# Patient Record
Sex: MALE | Race: WHITE | URBAN - METROPOLITAN AREA
[De-identification: names, ages, dates, MRNs, and addresses within clinical notes are randomized per-mention and may not be internally consistent; named-entity substitution may affect disease eponyms.]

---

## 2018-02-21 ENCOUNTER — TELEPHONE (OUTPATIENT)
Dept: CARDIOLOGY CLINIC | Age: 48
End: 2018-02-21

## 2018-02-21 NOTE — TELEPHONE ENCOUNTER
Medication Refill    When was your last appointment with cardiology?  3/22/18 appt  [facility] pt  (if 1year or longer, please schedule an appointment)    Medication needing refilled: Metoprolol tartrate    Doseage of the medication:25 mg     How are you taking this medication (QD, BID, TID, QID, PRN): one tablet twice daily    Patient want a 30 or 90 day supply called in:30    Which Pharmacy are we sending the medication to: [facility] Phone number: [phone]    Pharmacy Fax number:

## 2018-03-22 ENCOUNTER — OFFICE VISIT (OUTPATIENT)
Dept: CARDIOLOGY CLINIC | Age: 48
End: 2018-03-22

## 2018-03-22 VITALS
DIASTOLIC BLOOD PRESSURE: 78 MMHG | HEART RATE: 62 BPM | OXYGEN SATURATION: 99 % | HEIGHT: 69 IN | WEIGHT: 196.4 LBS | BODY MASS INDEX: 29.09 KG/M2 | SYSTOLIC BLOOD PRESSURE: 120 MMHG

## 2018-03-22 DIAGNOSIS — Z95.810 AICD (AUTOMATIC CARDIOVERTER/DEFIBRILLATOR) PRESENT: Primary | ICD-10-CM

## 2018-03-22 DIAGNOSIS — I47.29 PVT (PAROXYSMAL VENTRICULAR TACHYCARDIA): ICD-10-CM

## 2018-03-22 DIAGNOSIS — I10 BENIGN ESSENTIAL HTN: ICD-10-CM

## 2018-03-22 DIAGNOSIS — Z79.899 ON AMIODARONE THERAPY: ICD-10-CM

## 2018-03-22 DIAGNOSIS — Z95.810 ICD (IMPLANTABLE CARDIOVERTER-DEFIBRILLATOR) IN PLACE: ICD-10-CM

## 2018-03-22 DIAGNOSIS — I45.81 LONG Q-T SYNDROME: ICD-10-CM

## 2018-03-22 PROCEDURE — 93283 PRGRMG EVAL IMPLANTABLE DFB: CPT | Performed by: INTERNAL MEDICINE

## 2018-03-22 PROCEDURE — 93000 ELECTROCARDIOGRAM COMPLETE: CPT | Performed by: INTERNAL MEDICINE

## 2018-03-22 PROCEDURE — 99204 OFFICE O/P NEW MOD 45 MIN: CPT | Performed by: INTERNAL MEDICINE

## 2018-03-22 RX ORDER — PREDNISONE 1 MG/1
5 TABLET ORAL DAILY
COMMUNITY
End: 2022-01-13 | Stop reason: ALTCHOICE

## 2018-03-22 RX ORDER — LEVOTHYROXINE SODIUM 0.03 MG/1
75 TABLET ORAL DAILY
COMMUNITY

## 2018-03-22 RX ORDER — AMIODARONE HYDROCHLORIDE 200 MG/1
TABLET ORAL
COMMUNITY
Start: 2017-05-17 | End: 2018-09-26 | Stop reason: SDUPTHER

## 2018-03-22 NOTE — PROGRESS NOTES
· Neurological: Alert and oriented. Cranial nerve appears intact, No Gross deficit   · Skin: Skin is warm and dry. No rash noted. · Psychiatric: Has a normal behavior       Labs, diagnostic and imaging results reviewed. Reviewed. LABS:   Cr 1.30 (11/2016)    ECG:  Today 3/22/2018 personally reviewed  SB 57bpm  QTc 483msec    Echo: 3/2016  Summary:  The left ventricular wall motion is normal.  There is mild mitral regurgitation. There is a pacemaker lead in the right ventricle. Overall left ventricular ejection fraction is estimated to be 65-70%. The left ventricular function is normal.  The Aortic Valve is mildly calcified. ICD wire thick  Chiari network (normal variant) is noted. Mild tricuspid regurgitation is present. Right ventricular systolic pressure is normal at <35 mmHg. Medication:  Current Outpatient Prescriptions   Medication Sig Dispense Refill    amiodarone (CORDARONE) 200 MG tablet TAKE ONE-HALF TABLET BY MOUTH DAILY      levothyroxine (SYNTHROID) 25 MCG tablet Take 50 mcg by mouth daily       predniSONE (DELTASONE) 5 MG tablet Take 5 mg by mouth daily 1 tablet every other day      metoprolol tartrate (LOPRESSOR) 25 MG tablet Take 1 tablet by mouth 2 times daily 60 tablet 3     No current facility-administered medications for this visit. Assessment and plan:     1. PVT (paroxysmal ventricular tachycardia) (HCC)  History of sudden cardiac death in 2007. He has had two appropriate shocks and two inappropriate shocks for T wave oversensing for the lifetime of the device. He has had no ICD fires since 2009. Currently on Amiodarone 100 mg QOD     2. AICD (automatic cardioverter/defibrillator) present  appropriate device function: The CIED was interrogated and programmed and I supervised and reviewed all the data. All findings and changes are in device interrogation sheet and reflect my personal interpretation and changes and is scanned to Epic.     AT/AF episodes, all show

## 2018-03-26 PROBLEM — I45.81 LONG Q-T SYNDROME: Status: ACTIVE | Noted: 2018-03-26

## 2018-03-26 PROBLEM — Z95.810 ICD (IMPLANTABLE CARDIOVERTER-DEFIBRILLATOR) IN PLACE: Status: ACTIVE | Noted: 2018-03-26

## 2018-06-01 ENCOUNTER — PROCEDURE VISIT (OUTPATIENT)
Dept: CARDIOLOGY CLINIC | Age: 48
End: 2018-06-01

## 2018-06-01 DIAGNOSIS — Z95.810 ICD (IMPLANTABLE CARDIOVERTER-DEFIBRILLATOR) IN PLACE: Primary | ICD-10-CM

## 2018-09-26 RX ORDER — AMIODARONE HYDROCHLORIDE 200 MG/1
TABLET ORAL
Qty: 45 TABLET | Refills: 3 | Status: SHIPPED | OUTPATIENT
Start: 2018-09-26 | End: 2019-08-19 | Stop reason: SDUPTHER

## 2018-09-26 NOTE — TELEPHONE ENCOUNTER
Pt had labs done in July, they are in care everywhere    Last ov 3/22/18  Pending appt due in a year  Last refill not filled here yet

## 2019-03-28 PROCEDURE — 93228 REMOTE 30 DAY ECG REV/REPORT: CPT | Performed by: INTERNAL MEDICINE

## 2019-04-01 DIAGNOSIS — I45.81 LONG Q-T SYNDROME: ICD-10-CM

## 2019-04-01 DIAGNOSIS — Z95.810 ICD (IMPLANTABLE CARDIOVERTER-DEFIBRILLATOR) IN PLACE: ICD-10-CM

## 2019-07-30 NOTE — PROGRESS NOTES
[address]   Electrophysiology Follow up  Date: 2019    Chief Complaint   Patient presents with    Tachycardia        HPI: Lc Bowers is a 44 y.o. male seen for a maintenance evaluation for PVT. History includes Vfib and VT, AICD implant for secondary prevention of sudden cardiac death. Brother  from sudden cardiac death at age 36. 3/2/2016 genetic testing was positive for SCN5A mutation. 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 This variant has been reported in association with ST elevation and prolonged QT intervals in some individuals. Continued with amiodarone due to fear of ICD shock which has occurred in past with palpitations while not on amio. No shock, syncope, palpitation, or VT on device as of last OV 2017. ECG interpretation 2017:  Sinus bradycardia, indeterminate axis, inferior infarct, age undetermined 57 bpm, QTc 478 ms    Interval History: There was noise on his atrial lead in  and July. He is not sure what he was doing. Could have been when in a swimming pool or when showering. His niece had an inappropriate shock while in pool due to electrical issues          Past Medical History:   Diagnosis Date    Anxiety     Hyperthyroidism         No relevant past surgical history . Allergies: Allergies   Allergen Reactions    Penicillins      SOB       Social History:   reports that he has been smoking. He has never used smokeless tobacco. He reports that he drinks alcohol. He reports that he does not use drugs. Family History:  family history includes Coronary Art Dis in his father; Heart Disease in his father. Reviewed. Denies family history of sudden cardiac death, arrhythmia, premature CAD    Review of System:  All other systems reviewed and are negative except for that noted above.  Pertinent negatives are:   General: negative for fever, chills   Ophthalmic ROS: negative for - eye pain or loss of vision  ENT noted.  Mild tricuspid regurgitation is present. Right ventricular systolic pressure is normal at <35 mmHg. Medication:  Current Outpatient Medications   Medication Sig Dispense Refill    metoprolol tartrate (LOPRESSOR) 25 MG tablet TAKE ONE TABLET BY MOUTH TWICE A  tablet 3    amiodarone (CORDARONE) 200 MG tablet TAKE ONE-HALF TABLET BY MOUTH DAILY 45 tablet 3    levothyroxine (SYNTHROID) 25 MCG tablet Take 50 mcg by mouth daily       predniSONE (DELTASONE) 5 MG tablet Take 5 mg by mouth daily 1 tablet every other day       No current facility-administered medications for this visit. Assessment and plan:     1. Paroxysmal VT (paroxysmal ventricular tachycardia) ([facility])  History of sudden cardiac death in 2007. He has had two appropriate shocks and two inappropriate shocks for T wave oversensing for the lifetime of the device. He has had no ICD fires since 2009. Currently on Amiodarone 100 mg QOD   No side effects   No shock or VT    2. AICD (automatic cardioverter/defibrillator) present   The CIED was interrogated and programmed and I supervised and reviewed all the data. All findings and changes are in device interrogation sheet and reflect my personal interpretation and changes and is scanned to Epic. Noise on atrial everett  Likely SAKSHI      3. On amiodarone therapy  100 mg every other day. If recurrent VT, will substitute(mexiletine) or consider ablation    4. Hypertension: Controlled blood pressure. Currently on Metoprolol. 5.SCN5A mutation: QT is prolonged and he has rsr'. May need to do procainamide challenge in future to see if the presentation is Brugada or not. However, management is the same    Plan:  Follow up in 1 year  TSH and LFT     I independently reviewed *device check interrogation     Thank you for allowing me to participate in the care of [address] [place] [address]. Further evaluation will be based upon the patient's clinical course and testing results.      All questions

## 2019-07-31 ENCOUNTER — OFFICE VISIT (OUTPATIENT)
Dept: CARDIOLOGY CLINIC | Age: 49
End: 2019-07-31
Payer: COMMERCIAL

## 2019-07-31 ENCOUNTER — PROCEDURE VISIT (OUTPATIENT)
Dept: CARDIOLOGY CLINIC | Age: 49
End: 2019-07-31
Payer: COMMERCIAL

## 2019-07-31 VITALS
HEIGHT: 69 IN | BODY MASS INDEX: 29.18 KG/M2 | SYSTOLIC BLOOD PRESSURE: 136 MMHG | HEART RATE: 73 BPM | DIASTOLIC BLOOD PRESSURE: 88 MMHG | WEIGHT: 197 LBS

## 2019-07-31 DIAGNOSIS — Z79.899 LONG TERM CURRENT USE OF AMIODARONE: ICD-10-CM

## 2019-07-31 DIAGNOSIS — I47.20 VT (VENTRICULAR TACHYCARDIA): Primary | ICD-10-CM

## 2019-07-31 DIAGNOSIS — I45.81 LONG Q-T SYNDROME: ICD-10-CM

## 2019-07-31 DIAGNOSIS — R00.0 TACHYCARDIA: ICD-10-CM

## 2019-07-31 DIAGNOSIS — Z95.810 AICD (AUTOMATIC CARDIOVERTER/DEFIBRILLATOR) PRESENT: ICD-10-CM

## 2019-07-31 DIAGNOSIS — I10 BENIGN ESSENTIAL HTN: ICD-10-CM

## 2019-07-31 DIAGNOSIS — Z95.810 ICD (IMPLANTABLE CARDIOVERTER-DEFIBRILLATOR) IN PLACE: ICD-10-CM

## 2019-07-31 PROCEDURE — 93000 ELECTROCARDIOGRAM COMPLETE: CPT | Performed by: INTERNAL MEDICINE

## 2019-07-31 PROCEDURE — 93283 PRGRMG EVAL IMPLANTABLE DFB: CPT | Performed by: INTERNAL MEDICINE

## 2019-07-31 PROCEDURE — 99215 OFFICE O/P EST HI 40 MIN: CPT | Performed by: INTERNAL MEDICINE

## 2019-08-20 ENCOUNTER — TELEPHONE (OUTPATIENT)
Dept: CARDIOLOGY CLINIC | Age: 49
End: 2019-08-20

## 2019-08-20 RX ORDER — AMIODARONE HYDROCHLORIDE 200 MG/1
100 TABLET ORAL EVERY OTHER DAY
Qty: 45 TABLET | Refills: 3 | Status: SHIPPED | OUTPATIENT
Start: 2019-08-20 | End: 2019-08-20 | Stop reason: DRUGHIGH

## 2019-08-20 RX ORDER — AMIODARONE HYDROCHLORIDE 200 MG/1
100 TABLET ORAL EVERY OTHER DAY
Qty: 45 TABLET | Refills: 1 | Status: SHIPPED | OUTPATIENT
Start: 2019-08-20 | End: 2019-08-22 | Stop reason: SDUPTHER

## 2019-08-21 ENCOUNTER — TELEPHONE (OUTPATIENT)
Dept: CARDIOLOGY CLINIC | Age: 49
End: 2019-08-21

## 2019-08-21 RX ORDER — AMIODARONE HYDROCHLORIDE 200 MG/1
TABLET ORAL
Qty: 45 TABLET | Refills: 1 | Status: SHIPPED | OUTPATIENT
Start: 2019-08-21 | End: 2020-08-12

## 2019-08-22 RX ORDER — AMIODARONE HYDROCHLORIDE 200 MG/1
100 TABLET ORAL EVERY OTHER DAY
Qty: 45 TABLET | Refills: 1 | Status: SHIPPED | OUTPATIENT
Start: 2019-08-22 | End: 2020-02-26 | Stop reason: SDUPTHER

## 2020-02-26 RX ORDER — AMIODARONE HYDROCHLORIDE 200 MG/1
100 TABLET ORAL DAILY
Qty: 45 TABLET | Refills: 1 | Status: SHIPPED | OUTPATIENT
Start: 2020-02-26 | End: 2020-08-12 | Stop reason: SDUPTHER

## 2020-02-26 NOTE — TELEPHONE ENCOUNTER
Prescription refill    Last OV: 07/31/2019    Last Refill:08/22/2019    Labs: ordered not drawn at this time    Future Appt:08/12/2020    Patient is taking a 0.5 tablet daily not every other day. Patient would like a refill for amiodarone 100 mg daily sent in.

## 2020-02-26 NOTE — TELEPHONE ENCOUNTER
Medication Refill    Medication needing refilled:amiodarone (CORDARONE)    Doseage of the medication: 200 mg    How are you taking this medication (QD, BID, TID, QID, PRN): Pt is taking 0.5 pill every day.  States Every other day was not working    30 or 90 day supply called in:     Which Pharmacy are we sending the medication to?: [facility] [address], [facility] [address] [facility] [phone]

## 2020-08-12 RX ORDER — AMIODARONE HYDROCHLORIDE 200 MG/1
100 TABLET ORAL DAILY
Qty: 45 TABLET | Refills: 0 | Status: SHIPPED | OUTPATIENT
Start: 2020-08-12 | End: 2020-11-06

## 2020-08-12 NOTE — TELEPHONE ENCOUNTER
Medication Refill    Medication needing refilled:amiodarone (CORDARONE) 200 MG tablet         Doseage of the medication:100mg    How are you taking this medication (QD, BID, TID, QID, PRN):Patient has been taking daily not every other day     30 or 90 day supply called in:  39  Which Pharmacy are we sending the medication to?:[facility] [address], [facility] [address]  [address] - F [phone]

## 2020-08-25 NOTE — PROGRESS NOTES
[address]   Electrophysiology Follow up  Date: 2020    Chief Complaint   Patient presents with    1 Year Follow Up    Tachycardia        HPI: Antonella Nye is a 44 y.o. male seen for a maintenance evaluation for PVT. History includes Vfib and VT, AICD implant for secondary prevention of sudden cardiac death. Brother  from sudden cardiac death at age 36. 3/2/2016 genetic testing was positive for SCN5A mutation. 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 This variant has been reported in association with ST elevation and prolonged QT intervals in some individuals. Continued with amiodarone due to fear of ICD shock which has occurred in past with palpitations while not on amio. Interval History:    He has been feeling well. He is on Amiodarone 100mg daily. He tried taking a quarter of a pill but he had some feelings of palpitation and went back to 100 mg. Past Medical History:   Diagnosis Date    Anxiety     Hyperthyroidism         No relevant past surgical history . Allergies: Allergies   Allergen Reactions    Penicillins      SOB       Social History:   reports that he has been smoking. He has never used smokeless tobacco. He reports current alcohol use. He reports that he does not use drugs. Family History:  family history includes Coronary Art Dis in his father; Heart Disease in his father. Reviewed. Denies family history of sudden cardiac death, arrhythmia, premature CAD    Review of System:  All other systems reviewed and are negative except for that noted above.  Pertinent negatives are:   General: negative for fever, chills   Ophthalmic ROS: negative for - eye pain or loss of vision  ENT ROS: negative for - headaches, sore throat   Respiratory: negative for - cough, sputum  Cardiovascular: Reviewed in HPI  Gastrointestinal: negative for - abdominal pain, diarrhea, N/V  Hematology: negative for - bleeding, blood clots, bruising or jaundice  Genito-Urinary:  negative for - Dysuria or incontinence  Musculoskeletal: negative for - Joint swelling, muscle pain  Neurological: negative for - confusion, dizziness, headaches   Psychiatric: No anxiety, no depression. Dermatological: negative for - rash    Physical Examination:  Vitals:    08/26/20 1452   BP: (!) 140/100   Pulse: Wt Readings from Last 3 Encounters:   08/26/20 198 lb (89.8 kg)   07/31/19 197 lb (89.4 kg)   03/22/18 196 lb 6.4 oz (89.1 kg)       · Constitutional: Oriented. No distress. · Head: Normocephalic and atraumatic. · Mouth/Throat: Oropharynx is clear and moist.   · Eyes: Conjunctivae normal. EOM are normal.   · Neck: Neck supple. No rigidity. No JVD present. · Cardiovascular: Normal rate, regular rhythm, S1&S2. · Pulmonary/Chest: Bilateral respiratory sounds. No wheezes, No rhonchi. · Abdominal: Soft. Bowel sounds present. No distension, No tenderness. · Musculoskeletal: No tenderness. No edema    · Lymphadenopathy: Has no cervical adenopathy. · Neurological: Alert and oriented. Cranial nerve appears intact, No Gross deficit   · Skin: Skin is warm and dry. No rash noted. · Psychiatric: Has a normal behavior       Labs, diagnostic and imaging results reviewed. Reviewed. No results found for: TSHREFLEX, TSH, CREATININE, AMIOD, AST, ALT  3/21/19  AST>31  ALT>19  LDL>188    ECG 8/26/20  sinus rsr', qtc 462  QTcH       Echo: 3/2016  Summary:  The left ventricular wall motion is normal.  There is mild mitral regurgitation. There is a pacemaker lead in the right ventricle. Overall left ventricular ejection fraction is estimated to be 65-70%. The left ventricular function is normal.  The Aortic Valve is mildly calcified. ICD wire thick  Chiari network (normal variant) is noted. Mild tricuspid regurgitation is present. Right ventricular systolic pressure is normal at <35 mmHg.     Medication:  Current Outpatient Medications   Medication Sig Dispense Refill    amiodarone (CORDARONE) 200 MG tablet Take 0.5 tablets by mouth daily 45 tablet 0    metoprolol tartrate (LOPRESSOR) 25 MG tablet TAKE ONE TABLET BY MOUTH TWICE A DAY 60 tablet 2    levothyroxine (SYNTHROID) 25 MCG tablet Take 50 mcg by mouth daily       predniSONE (DELTASONE) 5 MG tablet Take 5 mg by mouth daily 1 tablet every other day       No current facility-administered medications for this visit. Assessment and plan:     1. Paroxysmal VT (paroxysmal ventricular tachycardia) (HCC)  History of sudden cardiac death in 2007. He has had two appropriate shocks and two inappropriate shocks for T wave oversensing for the lifetime of the device. He has had no ICD fires since 2009. Currently on Amiodarone 100 mg daily   I suggested that he can skip taking amiodarone 1 or 2 days a week. No side effects   No shock or VT    2. AICD (automatic cardioverter/defibrillator) present   The CIED was interrogated and programmed and I supervised and reviewed all the data. All findings and changes are in device interrogation sheet and reflect my personal interpretation and changes and is scanned to Epic. Noise on atrial channel previously. Not seen this time  Likely SAKSHI   <0.1% TONIA 4.4 y      3. On amiodarone therapy  100 mg every  day. If recurrent VT, will substitute(mexiletine) or consider ablation  I suggested that he can skip 1 or 2 days a week    4. Hypertension: Not wellControlled blood pressure. Currently on Metoprolol. Will add amlodipine 5 mg. F/u with Dr. Asif Howell to adjust further if needed. 5.SCN5A mutation: QT is prolonged and he has rsr'. May need to do procainamide challenge in future to see if the presentation is Brugada or not. However, management is the same    Plan:    Amlodipine 5mg daily. He can follow-up with Dr. Asif Howell in 6 weeks to see if his pressure is controlled and if needed to adjust the medication.   Skip a dose of amiodarone twice weekly  Follow up in 1 year      Thank you for allowing me to participate in the care of [address] [place] [address]. Further evaluation will be based upon the patient's clinical course and testing results. All questions and concerns were addressed to the patient/family. Alternatives to my treatment were discussed. I have discussed the above stated plan and the patient verbalized understanding and agreed with the plan. NOTE: This report was transcribed using voice recognition software. Every effort was made to ensure accuracy, however, inadvertent computerized transcription errors may be present. Jaden Malcolm MD, [facility] [address] [facility]   Office: [phone]     Scribe attestation:  This note was scribed in the presence of Jaden Malcolm MD by Anna Keita RN    I, Dr. Jaden Malcolm personally performed the services described in this documentation as scribed by RN in my presence, and it is both accurate and complete.

## 2020-08-26 ENCOUNTER — NURSE ONLY (OUTPATIENT)
Dept: CARDIOLOGY CLINIC | Age: 50
End: 2020-08-26
Payer: COMMERCIAL

## 2020-08-26 ENCOUNTER — OFFICE VISIT (OUTPATIENT)
Dept: CARDIOLOGY CLINIC | Age: 50
End: 2020-08-26
Payer: COMMERCIAL

## 2020-08-26 VITALS
SYSTOLIC BLOOD PRESSURE: 140 MMHG | HEART RATE: 65 BPM | WEIGHT: 198 LBS | BODY MASS INDEX: 28.35 KG/M2 | DIASTOLIC BLOOD PRESSURE: 100 MMHG | HEIGHT: 70 IN

## 2020-08-26 PROCEDURE — G8427 DOCREV CUR MEDS BY ELIG CLIN: HCPCS | Performed by: INTERNAL MEDICINE

## 2020-08-26 PROCEDURE — 99214 OFFICE O/P EST MOD 30 MIN: CPT | Performed by: INTERNAL MEDICINE

## 2020-08-26 PROCEDURE — 93283 PRGRMG EVAL IMPLANTABLE DFB: CPT | Performed by: INTERNAL MEDICINE

## 2020-08-26 PROCEDURE — G8419 CALC BMI OUT NRM PARAM NOF/U: HCPCS | Performed by: INTERNAL MEDICINE

## 2020-08-26 PROCEDURE — 3017F COLORECTAL CA SCREEN DOC REV: CPT | Performed by: INTERNAL MEDICINE

## 2020-08-26 PROCEDURE — 4004F PT TOBACCO SCREEN RCVD TLK: CPT | Performed by: INTERNAL MEDICINE

## 2020-08-26 RX ORDER — AMLODIPINE BESYLATE 5 MG/1
5 TABLET ORAL DAILY
Qty: 90 TABLET | Refills: 3 | Status: SHIPPED | OUTPATIENT
Start: 2020-08-26 | End: 2022-01-13

## 2020-08-26 NOTE — PROGRESS NOTES
Patient comes in for programming evaluation for his defibrillator. All sensing and pacing parameters are within normal range. Battery life 4.4 years. AP 0.0%.  <0.1%. No episodes/events noted. No changes need to be made at this time. Please see interrogation for more detail. Optivol is within normal range. Patient will Dr. Bhavik Balderas today and follow up in 3 months in office or remotely.

## 2021-01-27 DIAGNOSIS — Z79.899 ON AMIODARONE THERAPY: Primary | ICD-10-CM

## 2021-01-27 RX ORDER — AMIODARONE HYDROCHLORIDE 200 MG/1
TABLET ORAL
Qty: 45 TABLET | Refills: 0 | OUTPATIENT
Start: 2021-01-27

## 2021-01-28 NOTE — TELEPHONE ENCOUNTER
Pt wife calling to make sure that pt does not run out of his medicine. I advised to get labs today. She stated that he is working and may not be able to test. She stated that he takes half tab daily, but if he has an\"episide\" , he will take a whole one. She says that he has 5 half tabs and is concerned he will run out. I assured her that we can fax orders wherever they want to test and once he has those done, we can send almost instantaneously, via the Internet. She seemed very upset and nervous. She will call back with a fax number for the labs and says she will have him test on Monday.

## 2021-02-02 ENCOUNTER — TELEPHONE (OUTPATIENT)
Dept: CARDIOLOGY CLINIC | Age: 51
End: 2021-02-02

## 2021-02-02 RX ORDER — AMIODARONE HYDROCHLORIDE 200 MG/1
TABLET ORAL
Qty: 45 TABLET | Refills: 3
Start: 2021-02-02 | End: 2021-02-03 | Stop reason: SDUPTHER

## 2021-02-02 NOTE — TELEPHONE ENCOUNTER
Left a message for the pt's wife/EC, to have the requested blood work done. Advised to call the office back when the labs have been drawn.

## 2021-02-02 NOTE — TELEPHONE ENCOUNTER
Cherelle Taylor called on answering service stating someone called and pt wants to know what it was about?  Pls call to advise Thank you

## 2021-02-02 NOTE — TELEPHONE ENCOUNTER
I spoke with pt wife. She had LM for me that pt had his labs done at  [facility] lab and requested his Amiodarone to be filled. I told her that we have not received yet. I told her I will call lab to have them faxed. Lab phone [phone]. I will scan into Epic when received. Labs scanned into Epic. Please adjust script as necessary.

## 2021-02-03 RX ORDER — AMIODARONE HYDROCHLORIDE 200 MG/1
TABLET ORAL
Qty: 45 TABLET | Refills: 3 | Status: SHIPPED | OUTPATIENT
Start: 2021-02-03 | End: 2021-12-09

## 2021-12-02 NOTE — TELEPHONE ENCOUNTER
Received refill request for Metoprolol from [facility].      Last OV: 08/26/2020 w/ MXA     Last Refill: 10/12/2021 #90 w/ 3 refills     Next Appointment: 01/13/2022 w/ BOBBY

## 2021-12-09 RX ORDER — AMIODARONE HYDROCHLORIDE 200 MG/1
TABLET ORAL
Qty: 45 TABLET | Refills: 0 | Status: SHIPPED | OUTPATIENT
Start: 2021-12-09 | End: 2022-02-15

## 2021-12-09 NOTE — TELEPHONE ENCOUNTER
Received refill request for Amiodarone from [address] [place] [address].      Last OV: 08/26/2020 w/ MXA     Last Labs: 01/30/2021 TSH,AST, ALT    Last Refill: 02/03/2021 #45 w/ 3 refills     Next Appointment: 01/13/2022 w/ MXA

## 2022-01-12 PROBLEM — I47.20 PAROXYSMAL VT (HCC): Status: ACTIVE | Noted: 2022-01-12

## 2022-01-12 PROBLEM — I47.29 PAROXYSMAL VT (HCC): Status: ACTIVE | Noted: 2022-01-12

## 2022-01-12 PROBLEM — I10 HTN (HYPERTENSION): Status: ACTIVE | Noted: 2022-01-12

## 2022-01-12 NOTE — PROGRESS NOTES
[address]   Electrophysiology Follow up  Date: 2022    Chief Complaint   Patient presents with    1 Year Follow Up    Tachycardia        HPI: Mita Quinones is a 46 y.o. male seen for a maintenance evaluation for PVT. History includes Vfib and VT, AICD implant for secondary prevention of sudden cardiac death. Brother  from sudden cardiac death at age 36. 3/2/2016 genetic testing was positive for SCN5A mutation. 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 This variant has been reported in association with ST elevation and prolonged QT intervals in some individuals. Continued with amiodarone due to fear of ICD shock which has occurred in past with palpitations while not on amio. Interval History:   Mike Gimenez presents today in follow up. He slipped and  fell yesterday and is in pain. However he is doing fine otherwise from the heart standpoint      Assessment and plan:     Paroxysmal VT (paroxysmal ventricular tachycardia) ([facility] [address]   -History of sudden cardiac death in . He has had two appropriate shocks and two inappropriate shocks for T wave oversensing for the lifetime of the device. He has had no ICD fires since    - lopressor 12.5 mg BID      - Amiodarone 100 mg daily      ~ ALT 18 ,AST 23  2021     ~ TSH 2021  3.84- on synthroid  50 mcg    I suggested that he can skip taking amiodarone 1 or 2 days a week. No shock or VT on interrogation today     He takes his amiodarone 100 mg daily but he does forget sometimes. I explained to him that I actually prefer him to take it every other day and that he can either do that to continue to miss some doses which allow him to take a lower total dose. We will continue to monitor for side effects. AICD (automatic cardioverter/defibrillator) present   - The CIED was interrogated and programmed and I supervised and reviewed all the data.  All findings and changes are in device interrogation sheet and reflect my personal interpretation and changes and is scanned to Epic. 3.5years remaining, AP 0% ,  0%         On amiodarone therapy  100 mg every  day. If recurrent VT, will substitute(mexiletine) or consider ablation  I suggested that he can skip 1 or 2 days a week- or take every other day       HTN  - Not well controlled 141/91  -BP goal <130/80  -Home BP monitoring encouraged, printed information provided on how to accurately measure BP at home.  -Counseled to follow a low salt diet to assure blood pressure remains controlled for cardiovascular risk factor modification.   -The patient is counseled to get regular exercise 3-5 times per week and maintain a healthy weight reduce cardiovascular risk factors. - Continue lopressor, norvasc - increase to 10 mg        SCN5A mutation: QT is prolonged and he has rsr'. May need to do procainamide challenge in future to see if the presentation is Brugada or not. However, management is the same    Plan:    Continue with low-dose amiodarone with monitoring for side effects. Follow-up in 1 year. Past Medical History:   Diagnosis Date    Anxiety     HTN (hypertension) 1/12/2022    Hyperthyroidism         No relevant past surgical history . Allergies: Allergies   Allergen Reactions    Penicillins      SOB       Social History:   reports that he has been smoking. He has never used smokeless tobacco. He reports current alcohol use. He reports that he does not use drugs. Family History:  family history includes Coronary Art Dis in his father; Heart Disease in his father. Reviewed. Denies family history of sudden cardiac death, arrhythmia, premature CAD    Review of System:  All other systems reviewed and are negative except for that noted above.  Pertinent negatives are:   General: negative for fever, chills   Ophthalmic ROS: negative for - eye pain or loss of vision  ENT ROS: negative for - headaches, sore throat   Respiratory: negative for - cough, sputum  Cardiovascular: Reviewed in HPI  Gastrointestinal: negative for - abdominal pain, diarrhea, N/V  Hematology: negative for - bleeding, blood clots, bruising or jaundice  Genito-Urinary:  negative for - Dysuria or incontinence  Musculoskeletal: negative for - Joint swelling, muscle pain  Neurological: negative for - confusion, dizziness, headaches   Psychiatric: No anxiety, no depression. Dermatological: negative for - rash    Physical Examination:  Vitals:    01/13/22 0752   BP: (!) 141/91   Pulse: 67   SpO2: 96%      Wt Readings from Last 3 Encounters:   01/13/22 204 lb 3.2 oz (92.6 kg)   08/26/20 198 lb (89.8 kg)   07/31/19 197 lb (89.4 kg)       · Constitutional: Oriented. No distress. · Head: Normocephalic and atraumatic. · Mouth/Throat: Oropharynx is clear and moist.   · Eyes: Conjunctivae normal. EOM are normal.   · Neck: Neck supple. No rigidity. No JVD present. · Cardiovascular: Normal rate, regular rhythm, S1&S2. · Pulmonary/Chest: Bilateral respiratory sounds. No wheezes, No rhonchi. · Abdominal: Soft. Bowel sounds present. No distension, No tenderness. · Musculoskeletal: No tenderness. No edema    · Lymphadenopathy: Has no cervical adenopathy. · Neurological: Alert and oriented. Cranial nerve appears intact, No Gross deficit   · Skin: Skin is warm and dry. No rash noted. · Psychiatric: Has a normal behavior       Labs, diagnostic and imaging results reviewed. Reviewed. No results found for: TSHREFLEX, TSH, CREATININE, AMIOD, AST, ALT  3/21/19  AST>31  ALT>19  LDL>188    ECG Today : Sinus Bradycardia rate 59      Echo: 3/2016  Summary:  The left ventricular wall motion is normal.  There is mild mitral regurgitation. There is a pacemaker lead in the right ventricle. Overall left ventricular ejection fraction is estimated to be 65-70%. The left ventricular function is normal.  The Aortic Valve is mildly calcified.   ICD wire thick  Chiari network (normal variant) is noted. Mild tricuspid regurgitation is present. Right ventricular systolic pressure is normal at <35 mmHg. Nuclear stress test 03/17/2016   Interpetation Summary \"Nuclear imaging results reported separately by   radiology department\":      1.Non-diagnostic ECG for ischemia with pharmocologic stress. Medication:  Current Outpatient Medications   Medication Sig Dispense Refill    meloxicam (MOBIC) 15 MG tablet TAKE ONE TABLET BY MOUTH DAILY      amLODIPine (NORVASC) 10 MG tablet Take 1 tablet by mouth daily 90 tablet 3    amiodarone (CORDARONE) 200 MG tablet TAKE 1/2 TABLET BY MOUTH DAILY OK TO TAKE 1/2 TABLET BY MOUTH EXTRA DAILY IF SYMPTOMATIC PALPITATIONS OCCUR (Patient taking differently: TAKE 1/2 TABLET BY MOUTH DAILY) 45 tablet 0    metoprolol tartrate (LOPRESSOR) 25 MG tablet TAKE ONE TABLET BY MOUTH TWICE A  tablet 3    levothyroxine (SYNTHROID) 25 MCG tablet Take 50 mcg by mouth daily        No current facility-administered medications for this visit. Thank you for allowing me to participate in the care of [address] [place] [address]. Further evaluation will be based upon the patient's clinical course and testing results. All questions and concerns were addressed to the patient/family. Alternatives to my treatment were discussed. I have discussed the above stated plan and the patient verbalized understanding and agreed with the plan. Scribe attestation: This note was scribed in the presence of Delaney Santos MD by Alvarez Fernandez RN    I, Dr. Delaney Santos personally performed the services described in this documentation as scribed by RN in my presence, and it is both accurate and complete.        NOTE: This report was transcribed using voice recognition software. Every effort was made to ensure accuracy, however, inadvertent computerized transcription errors may be present.        Delaney Santos MD, [facility]   Office: [phone]

## 2022-01-13 ENCOUNTER — NURSE ONLY (OUTPATIENT)
Dept: CARDIOLOGY CLINIC | Age: 52
End: 2022-01-13
Payer: COMMERCIAL

## 2022-01-13 ENCOUNTER — OFFICE VISIT (OUTPATIENT)
Dept: CARDIOLOGY CLINIC | Age: 52
End: 2022-01-13
Payer: COMMERCIAL

## 2022-01-13 VITALS
HEIGHT: 70 IN | DIASTOLIC BLOOD PRESSURE: 91 MMHG | WEIGHT: 204.2 LBS | SYSTOLIC BLOOD PRESSURE: 141 MMHG | HEART RATE: 67 BPM | OXYGEN SATURATION: 96 % | BODY MASS INDEX: 29.23 KG/M2

## 2022-01-13 DIAGNOSIS — Z79.899 LONG TERM CURRENT USE OF AMIODARONE: ICD-10-CM

## 2022-01-13 DIAGNOSIS — I10 HYPERTENSION, UNSPECIFIED TYPE: ICD-10-CM

## 2022-01-13 DIAGNOSIS — I45.81 LONG Q-T SYNDROME: ICD-10-CM

## 2022-01-13 DIAGNOSIS — I49.8 SCN5A-RELATED BRUGADA SYNDROME 1: ICD-10-CM

## 2022-01-13 DIAGNOSIS — Z95.810 ICD (IMPLANTABLE CARDIOVERTER-DEFIBRILLATOR) IN PLACE: ICD-10-CM

## 2022-01-13 DIAGNOSIS — Z95.810 AICD (AUTOMATIC CARDIOVERTER/DEFIBRILLATOR) PRESENT: ICD-10-CM

## 2022-01-13 DIAGNOSIS — I47.29 PAROXYSMAL VT: ICD-10-CM

## 2022-01-13 DIAGNOSIS — I47.29 PAROXYSMAL VT: Primary | ICD-10-CM

## 2022-01-13 PROCEDURE — 93283 PRGRMG EVAL IMPLANTABLE DFB: CPT | Performed by: INTERNAL MEDICINE

## 2022-01-13 PROCEDURE — 99214 OFFICE O/P EST MOD 30 MIN: CPT | Performed by: INTERNAL MEDICINE

## 2022-01-13 PROCEDURE — G8484 FLU IMMUNIZE NO ADMIN: HCPCS | Performed by: INTERNAL MEDICINE

## 2022-01-13 PROCEDURE — G8419 CALC BMI OUT NRM PARAM NOF/U: HCPCS | Performed by: INTERNAL MEDICINE

## 2022-01-13 PROCEDURE — G8427 DOCREV CUR MEDS BY ELIG CLIN: HCPCS | Performed by: INTERNAL MEDICINE

## 2022-01-13 PROCEDURE — 3017F COLORECTAL CA SCREEN DOC REV: CPT | Performed by: INTERNAL MEDICINE

## 2022-01-13 PROCEDURE — 4004F PT TOBACCO SCREEN RCVD TLK: CPT | Performed by: INTERNAL MEDICINE

## 2022-01-13 RX ORDER — MELOXICAM 15 MG/1
TABLET ORAL
COMMUNITY
Start: 2021-12-17

## 2022-01-13 RX ORDER — AMLODIPINE BESYLATE 10 MG/1
10 TABLET ORAL DAILY
Qty: 90 TABLET | Refills: 3
Start: 2022-01-13

## 2022-02-15 RX ORDER — AMIODARONE HYDROCHLORIDE 200 MG/1
TABLET ORAL
Qty: 45 TABLET | Refills: 0 | Status: SHIPPED | OUTPATIENT
Start: 2022-02-15 | End: 2022-05-09

## 2022-05-09 RX ORDER — AMIODARONE HYDROCHLORIDE 200 MG/1
TABLET ORAL
Qty: 45 TABLET | Refills: 3 | Status: SHIPPED | OUTPATIENT
Start: 2022-05-09

## 2022-05-09 NOTE — TELEPHONE ENCOUNTER
Received refill request for Amiodarone 200mg from [id number] [address] : 01/13/2022 with MXA    Last EKG : 01/13/2022    Last Refill : 02/15/2022 43 w/0 refill    Next OV : Pt on recall list 01/13/2023 with MXA

## 2022-07-08 ENCOUNTER — APPOINTMENT (OUTPATIENT)
Dept: GENERAL RADIOLOGY | Age: 52
End: 2022-07-08
Payer: COMMERCIAL

## 2022-07-08 ENCOUNTER — HOSPITAL ENCOUNTER (OUTPATIENT)
Age: 52
Setting detail: OBSERVATION
Discharge: HOME | End: 2022-07-09
Attending: INTERNAL MEDICINE | Admitting: INTERNAL MEDICINE
Payer: COMMERCIAL

## 2022-07-08 ENCOUNTER — NURSE ONLY (OUTPATIENT)
Dept: CARDIOLOGY CLINIC | Age: 52
End: 2022-07-08
Payer: COMMERCIAL

## 2022-07-08 DIAGNOSIS — Z95.810 AICD (AUTOMATIC CARDIOVERTER/DEFIBRILLATOR) PRESENT: ICD-10-CM

## 2022-07-08 DIAGNOSIS — I47.20 VT (VENTRICULAR TACHYCARDIA): ICD-10-CM

## 2022-07-08 DIAGNOSIS — I45.81 LONG Q-T SYNDROME: ICD-10-CM

## 2022-07-08 PROBLEM — R07.9 CHEST PAIN: Status: ACTIVE | Noted: 2022-07-08

## 2022-07-08 LAB
ANION GAP SERPL CALCULATED.3IONS-SCNC: 10 MMOL/L (ref 3–16)
BASOPHILS ABSOLUTE: 0.1 K/UL (ref 0–0.2)
BASOPHILS RELATIVE PERCENT: 1 %
BUN BLDV-MCNC: 20 MG/DL (ref 7–20)
CALCIUM SERPL-MCNC: 9.9 MG/DL (ref 8.3–10.6)
CHLORIDE BLD-SCNC: 103 MMOL/L (ref 99–110)
CO2: 26 MMOL/L (ref 21–32)
CREAT SERPL-MCNC: 1.2 MG/DL (ref 0.9–1.3)
D DIMER: 0.47 UG/ML FEU (ref 0–0.6)
EOSINOPHILS ABSOLUTE: 0.3 K/UL (ref 0–0.6)
EOSINOPHILS RELATIVE PERCENT: 2.7 %
GFR AFRICAN AMERICAN: >60
GFR NON-AFRICAN AMERICAN: >60
GLUCOSE BLD-MCNC: 139 MG/DL (ref 70–99)
HCT VFR BLD CALC: 48.9 % (ref 40.5–52.5)
HEMOGLOBIN: 16.8 G/DL (ref 13.5–17.5)
INFLUENZA A: NOT DETECTED
INFLUENZA B: NOT DETECTED
LYMPHOCYTES ABSOLUTE: 1.6 K/UL (ref 1–5.1)
LYMPHOCYTES RELATIVE PERCENT: 16.1 %
MCH RBC QN AUTO: 34.6 PG (ref 26–34)
MCHC RBC AUTO-ENTMCNC: 34.4 G/DL (ref 31–36)
MCV RBC AUTO: 100.6 FL (ref 80–100)
MONOCYTES ABSOLUTE: 0.5 K/UL (ref 0–1.3)
MONOCYTES RELATIVE PERCENT: 5 %
NEUTROPHILS ABSOLUTE: 7.6 K/UL (ref 1.7–7.7)
NEUTROPHILS RELATIVE PERCENT: 75.2 %
PDW BLD-RTO: 13.5 % (ref 12.4–15.4)
PLATELET # BLD: 194 K/UL (ref 135–450)
PMV BLD AUTO: 9.5 FL (ref 5–10.5)
POTASSIUM SERPL-SCNC: 4.5 MMOL/L (ref 3.5–5.1)
RBC # BLD: 4.86 M/UL (ref 4.2–5.9)
SARS-COV-2 RNA, RT PCR: NOT DETECTED
SODIUM BLD-SCNC: 139 MMOL/L (ref 136–145)
TROPONIN: <0.01 NG/ML
WBC # BLD: 10.1 K/UL (ref 4–11)

## 2022-07-08 PROCEDURE — 71045 X-RAY EXAM CHEST 1 VIEW: CPT

## 2022-07-08 PROCEDURE — 6370000000 HC RX 637 (ALT 250 FOR IP): Performed by: PHYSICIAN ASSISTANT

## 2022-07-08 PROCEDURE — 85025 COMPLETE CBC W/AUTO DIFF WBC: CPT

## 2022-07-08 PROCEDURE — G0378 HOSPITAL OBSERVATION PER HR: HCPCS

## 2022-07-08 PROCEDURE — 93005 ELECTROCARDIOGRAM TRACING: CPT | Performed by: PHYSICIAN ASSISTANT

## 2022-07-08 PROCEDURE — 84484 ASSAY OF TROPONIN QUANT: CPT

## 2022-07-08 PROCEDURE — 80048 BASIC METABOLIC PNL TOTAL CA: CPT

## 2022-07-08 PROCEDURE — C9113 INJ PANTOPRAZOLE SODIUM, VIA: HCPCS | Performed by: INTERNAL MEDICINE

## 2022-07-08 PROCEDURE — 99285 EMERGENCY DEPT VISIT HI MDM: CPT

## 2022-07-08 PROCEDURE — 99204 OFFICE O/P NEW MOD 45 MIN: CPT | Performed by: INTERNAL MEDICINE

## 2022-07-08 PROCEDURE — 96374 THER/PROPH/DIAG INJ IV PUSH: CPT

## 2022-07-08 PROCEDURE — 93289 INTERROG DEVICE EVAL HEART: CPT | Performed by: INTERNAL MEDICINE

## 2022-07-08 PROCEDURE — 6360000002 HC RX W HCPCS: Performed by: INTERNAL MEDICINE

## 2022-07-08 PROCEDURE — 96375 TX/PRO/DX INJ NEW DRUG ADDON: CPT

## 2022-07-08 PROCEDURE — 87636 SARSCOV2 & INF A&B AMP PRB: CPT

## 2022-07-08 PROCEDURE — 93005 ELECTROCARDIOGRAM TRACING: CPT | Performed by: INTERNAL MEDICINE

## 2022-07-08 PROCEDURE — 85379 FIBRIN DEGRADATION QUANT: CPT

## 2022-07-08 PROCEDURE — 6370000000 HC RX 637 (ALT 250 FOR IP): Performed by: INTERNAL MEDICINE

## 2022-07-08 PROCEDURE — 93970 EXTREMITY STUDY: CPT

## 2022-07-08 PROCEDURE — 36415 COLL VENOUS BLD VENIPUNCTURE: CPT

## 2022-07-08 RX ORDER — SODIUM CHLORIDE 0.9 % (FLUSH) 0.9 %
5-40 SYRINGE (ML) INJECTION PRN
Status: DISCONTINUED | OUTPATIENT
Start: 2022-07-08 | End: 2022-07-09 | Stop reason: HOSPADM

## 2022-07-08 RX ORDER — HYDROXYZINE PAMOATE 25 MG/1
25 CAPSULE ORAL ONCE
Status: COMPLETED | OUTPATIENT
Start: 2022-07-08 | End: 2022-07-08

## 2022-07-08 RX ORDER — AMLODIPINE BESYLATE 5 MG/1
10 TABLET ORAL DAILY
Status: DISCONTINUED | OUTPATIENT
Start: 2022-07-08 | End: 2022-07-09 | Stop reason: HOSPADM

## 2022-07-08 RX ORDER — AMIODARONE HYDROCHLORIDE 200 MG/1
100 TABLET ORAL DAILY
Status: DISCONTINUED | OUTPATIENT
Start: 2022-07-09 | End: 2022-07-09 | Stop reason: HOSPADM

## 2022-07-08 RX ORDER — NITROGLYCERIN 0.4 MG/1
0.4 TABLET SUBLINGUAL ONCE
Status: DISCONTINUED | OUTPATIENT
Start: 2022-07-08 | End: 2022-07-08 | Stop reason: SDUPTHER

## 2022-07-08 RX ORDER — NITROGLYCERIN 0.4 MG/1
0.4 TABLET SUBLINGUAL EVERY 5 MIN PRN
Status: DISCONTINUED | OUTPATIENT
Start: 2022-07-08 | End: 2022-07-09 | Stop reason: HOSPADM

## 2022-07-08 RX ORDER — ONDANSETRON 4 MG/1
4 TABLET, ORALLY DISINTEGRATING ORAL EVERY 8 HOURS PRN
Status: DISCONTINUED | OUTPATIENT
Start: 2022-07-08 | End: 2022-07-09 | Stop reason: HOSPADM

## 2022-07-08 RX ORDER — ATORVASTATIN CALCIUM 40 MG/1
40 TABLET, FILM COATED ORAL NIGHTLY
Status: DISCONTINUED | OUTPATIENT
Start: 2022-07-08 | End: 2022-07-09 | Stop reason: HOSPADM

## 2022-07-08 RX ORDER — MORPHINE SULFATE 2 MG/ML
2 INJECTION, SOLUTION INTRAMUSCULAR; INTRAVENOUS EVERY 4 HOURS PRN
Status: DISCONTINUED | OUTPATIENT
Start: 2022-07-08 | End: 2022-07-09 | Stop reason: HOSPADM

## 2022-07-08 RX ORDER — LEVOTHYROXINE SODIUM 0.03 MG/1
75 TABLET ORAL DAILY
Status: DISCONTINUED | OUTPATIENT
Start: 2022-07-09 | End: 2022-07-09 | Stop reason: HOSPADM

## 2022-07-08 RX ORDER — ASPIRIN 81 MG/1
81 TABLET, CHEWABLE ORAL DAILY
Status: DISCONTINUED | OUTPATIENT
Start: 2022-07-09 | End: 2022-07-09 | Stop reason: HOSPADM

## 2022-07-08 RX ORDER — ENOXAPARIN SODIUM 100 MG/ML
40 INJECTION SUBCUTANEOUS NIGHTLY
Status: DISCONTINUED | OUTPATIENT
Start: 2022-07-08 | End: 2022-07-09 | Stop reason: HOSPADM

## 2022-07-08 RX ORDER — ACETAMINOPHEN 650 MG/1
650 SUPPOSITORY RECTAL EVERY 6 HOURS PRN
Status: DISCONTINUED | OUTPATIENT
Start: 2022-07-08 | End: 2022-07-09 | Stop reason: HOSPADM

## 2022-07-08 RX ORDER — SODIUM CHLORIDE 0.9 % (FLUSH) 0.9 %
5-40 SYRINGE (ML) INJECTION EVERY 12 HOURS SCHEDULED
Status: DISCONTINUED | OUTPATIENT
Start: 2022-07-08 | End: 2022-07-09 | Stop reason: HOSPADM

## 2022-07-08 RX ORDER — LEVOTHYROXINE SODIUM 0.03 MG/1
50 TABLET ORAL DAILY
Status: DISCONTINUED | OUTPATIENT
Start: 2022-07-08 | End: 2022-07-08

## 2022-07-08 RX ORDER — ACETAMINOPHEN 500 MG
500 TABLET ORAL EVERY 6 HOURS PRN
Status: ON HOLD | COMMUNITY
End: 2022-07-09 | Stop reason: HOSPADM

## 2022-07-08 RX ORDER — POLYETHYLENE GLYCOL 3350 17 G/17G
17 POWDER, FOR SOLUTION ORAL DAILY PRN
Status: DISCONTINUED | OUTPATIENT
Start: 2022-07-08 | End: 2022-07-09 | Stop reason: HOSPADM

## 2022-07-08 RX ORDER — PANTOPRAZOLE SODIUM 40 MG/10ML
40 INJECTION, POWDER, LYOPHILIZED, FOR SOLUTION INTRAVENOUS DAILY
Status: DISCONTINUED | OUTPATIENT
Start: 2022-07-08 | End: 2022-07-09 | Stop reason: HOSPADM

## 2022-07-08 RX ORDER — ASPIRIN 325 MG
325 TABLET ORAL DAILY
COMMUNITY

## 2022-07-08 RX ORDER — ACETAMINOPHEN 325 MG/1
650 TABLET ORAL EVERY 6 HOURS PRN
Status: DISCONTINUED | OUTPATIENT
Start: 2022-07-08 | End: 2022-07-09 | Stop reason: HOSPADM

## 2022-07-08 RX ORDER — SODIUM CHLORIDE 9 MG/ML
INJECTION, SOLUTION INTRAVENOUS PRN
Status: DISCONTINUED | OUTPATIENT
Start: 2022-07-08 | End: 2022-07-09 | Stop reason: HOSPADM

## 2022-07-08 RX ORDER — ONDANSETRON 2 MG/ML
4 INJECTION INTRAMUSCULAR; INTRAVENOUS EVERY 6 HOURS PRN
Status: DISCONTINUED | OUTPATIENT
Start: 2022-07-08 | End: 2022-07-09 | Stop reason: HOSPADM

## 2022-07-08 RX ADMIN — PANTOPRAZOLE SODIUM 40 MG: 40 INJECTION, POWDER, LYOPHILIZED, FOR SOLUTION INTRAVENOUS at 18:01

## 2022-07-08 RX ADMIN — ONDANSETRON 4 MG: 2 INJECTION INTRAMUSCULAR; INTRAVENOUS at 21:16

## 2022-07-08 RX ADMIN — AMLODIPINE BESYLATE 10 MG: 5 TABLET ORAL at 18:01

## 2022-07-08 RX ADMIN — ALUMINUM HYDROXIDE, MAGNESIUM HYDROXIDE, AND SIMETHICONE: 200; 200; 20 SUSPENSION ORAL at 15:30

## 2022-07-08 RX ADMIN — HYDROXYZINE PAMOATE 25 MG: 25 CAPSULE ORAL at 13:31

## 2022-07-08 RX ADMIN — MORPHINE SULFATE 2 MG: 2 INJECTION, SOLUTION INTRAMUSCULAR; INTRAVENOUS at 21:15

## 2022-07-08 RX ADMIN — ATORVASTATIN CALCIUM 40 MG: 40 TABLET, FILM COATED ORAL at 20:59

## 2022-07-08 ASSESSMENT — PAIN DESCRIPTION - PAIN TYPE: TYPE: ACUTE PAIN

## 2022-07-08 ASSESSMENT — PAIN - FUNCTIONAL ASSESSMENT
PAIN_FUNCTIONAL_ASSESSMENT: ACTIVITIES ARE NOT PREVENTED
PAIN_FUNCTIONAL_ASSESSMENT: 0-10
PAIN_FUNCTIONAL_ASSESSMENT: ACTIVITIES ARE NOT PREVENTED

## 2022-07-08 ASSESSMENT — PAIN DESCRIPTION - DESCRIPTORS
DESCRIPTORS: PRESSURE
DESCRIPTORS: PRESSURE

## 2022-07-08 ASSESSMENT — PAIN DESCRIPTION - LOCATION
LOCATION: CHEST

## 2022-07-08 ASSESSMENT — PAIN DESCRIPTION - ORIENTATION
ORIENTATION: MID
ORIENTATION: MID

## 2022-07-08 ASSESSMENT — PAIN SCALES - GENERAL
PAINLEVEL_OUTOF10: 4
PAINLEVEL_OUTOF10: 6
PAINLEVEL_OUTOF10: 5

## 2022-07-08 ASSESSMENT — PAIN DESCRIPTION - FREQUENCY: FREQUENCY: CONTINUOUS

## 2022-07-08 ASSESSMENT — LIFESTYLE VARIABLES
HOW MANY STANDARD DRINKS CONTAINING ALCOHOL DO YOU HAVE ON A TYPICAL DAY: 3 OR 4
HOW OFTEN DO YOU HAVE A DRINK CONTAINING ALCOHOL: 2-3 TIMES A WEEK

## 2022-07-08 ASSESSMENT — PAIN DESCRIPTION - ONSET: ONSET: ON-GOING

## 2022-07-08 NOTE — CONSULTS
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 CP since 7/3/22, intermittent, lasts seconds to minutes, nonexertional, no sob, no radiation, achy to pressure, variable intensity with fairly constant undercurrent of pain. Trop neg, EKG without acute changes. HISTORY/ALLERGY/ROS   MEDHx  has a past medical history of Anxiety, HTN (hypertension), and Hyperthyroidism. SURGHx  has no past surgical history on file. SOCHx  reports that he has been smoking. He has been smoking about 1.00 pack per day. He has never used smokeless tobacco. He reports current alcohol use. He reports that he does not use drugs. FAMHx family history includes Coronary Art Dis in his father; Heart Disease in his father.    ALLERG Penicillins   ROS Full ROS obtained and negative except as mentioned in HPI   MEDICATIONS   Current Facility-Administered Medications   Medication Dose Route Frequency Provider Last Rate Last Admin    nitroGLYCERIN (NITROSTAT) SL tablet 0.4 mg  0.4 mg SubLINGual Once Claudean Bene, PA        aluminum & magnesium hydroxide-simethicone (MAALOX) 30 mL, lidocaine viscous hcl (XYLOCAINE) 5 mL (GI COCKTAIL)   Oral Once Claudean Bene, PA        sodium chloride flush 0.9 % injection 5-40 mL  5-40 mL IntraVENous 2 times per day Dilia Sierra MD        sodium chloride flush 0.9 % injection 5-40 mL  5-40 mL IntraVENous PRN Dilia Sierra MD        0.9 % sodium chloride infusion   IntraVENous PRN Dilia Sierra MD        ondansetron (ZOFRAN-ODT) disintegrating tablet 4 mg  4 mg Oral Q8H PRN Dilia Sierra MD        Or    ondansetron [facility]) injection 4 mg  4 mg IntraVENous Q6H PRN Dilia Sierra MD        acetaminophen (TYLENOL) tablet 650 mg  650 mg Oral Q6H PRN Dilia Sierra MD        Or    acetaminophen (TYLENOL) suppository 650 mg  650 mg Rectal Q6H PRN Dilia Sierra MD        polyethylene glycol [facility]) packet 17 g 17 g Oral Daily PRN Ivelisse Alexander MD        [START ON 7/9/2022] aspirin chewable tablet 81 mg  81 mg Oral Daily Ivelisse Alexander MD        atorvastatin (LIPITOR) tablet 40 mg  40 mg Oral Nightly Ivelisse Alexander MD        enoxaparin (LOVENOX) injection 40 mg  40 mg SubCUTAneous Daily Ivelisse Alexander MD        nitroGLYCERIN (NITROSTAT) SL tablet 0.4 mg  0.4 mg SubLINGual Q5 Min PRN Ivelisse Alexander MD        pantoprazole (PROTONIX) injection 40 mg  40 mg IntraVENous Daily Ivelisse Alexander MD         Current Outpatient Medications   Medication Sig Dispense Refill    amiodarone (CORDARONE) 200 MG tablet TAKE 1/2 TABLET BY MOUTH DAILY 45 tablet 3    meloxicam (MOBIC) 15 MG tablet TAKE ONE TABLET BY MOUTH DAILY      amLODIPine (NORVASC) 10 MG tablet Take 1 tablet by mouth daily 90 tablet 3    metoprolol tartrate (LOPRESSOR) 25 MG tablet TAKE ONE TABLET BY MOUTH TWICE A  tablet 3    levothyroxine (SYNTHROID) 25 MCG tablet Take 50 mcg by mouth daily         PHYSICAL EXAM   Vitals Vitals:    07/08/22 1400   BP: 119/75   Pulse: 62   Resp: 15   Temp:    SpO2: 100%      Gen Alert, coop, no distress Heart  Rrr, no mrg   Head NC, AT, no abnorm Abd  Soft, NT, +BS, no mass, no OM   Eyes PER, conj/corn clear Ext  Ext nl, AT, no C/C/E   Nose Nares nl, no drain, NT Pulse 2+ and symmetric   Throat Lips, mucosa, tongue nl Skin Col/text/turg nl, no vis rash/les   Neck S/S, TM, NT, no bruit/JVD Psych Nl mood and affect   Lung CTA-B, unlabored, no DTP Lymph   No cervical or axillary LA   Ch wall NT, no deform Neuro  Nl gross M/S exam      ASSESSMENT AND PLAN   *CP  Status Atypical, hx normal cors 11/07, normal stress 3/16  Plan Discussed options including med v stress v lhc, patient prefers stress test   Echo in AM (ordered)   Lexiscan MPI in AM (ordered)  *VT  Plan EP consultation, well known to Dr. Dinora Hernandez

## 2022-07-08 NOTE — ED PROVIDER NOTES
BUCK. I have evaluated this patient. My supervising physician was available for consultation. Chief Complaint:   Chief Complaint   Patient presents with    Chest Pain     states ongoing since 7/5, was seen at [facility] for same thing, states he went to PCP today and was told to come to the ER. ED Course & Medical Decision Making  45 y.o. male presenting with chest pain.    -cbc/chem: Not concerning   -Trop: Negative x1    -EKG: sinus hasmukh. LAFB    -Chest xr: Lungs clear    -Heart score:  3   MDM: 57-year-old male presents for chest pain x3 days. Seen in ED at [facility].  Following up with his primary today, sent to the ED again with the intention of admission. Experiencing worsening exercise tolerance. Initial cardiac work-up in the ED negative. Because he has an elevated heart score, will admit for further cardiac rule out. Final Clinical Impression & Plan:  Chest pain  - ADMIT    ---------------------------------------------------------------------------------------------------------------  HPI:  PMH significant for HTN, anxiety, ICD    Presenting with anterior chest pain. Nothing makes it better, nothing makes it worse. Not pleuritic. Denies history of blood clots. No leg swelling. No belly pain. No shortness of breath. No fevers no chills no nausea no vomiting. Has Brugada syndrome, with an ICD. The ICD has not gone off recently. Pain is located in his anterior chest, around his sternum. It does not radiate. It is not exertional. He does reports worsening exercise tolerance over past two weeks. Is this patient to be included in the SEP-1 Core Measure due to severe sepsis or septic shock? No   Exclusion criteria - the patient is NOT to be included for SEP-1 Core Measure due to:   Infection is not suspected      Medical Hx: Past medical history reviewed, and pertinent for:     Past Medical History:   Diagnosis Date    Anxiety     HTN (hypertension) 1/12/2022    Hyperthyroidism        Patient Active Problem List   Diagnosis    ICD (implantable cardioverter-defibrillator) in place    Long Q-T syndrome    HTN (hypertension)    Paroxysmal VT ([facility] [address]         Surgical Hx:   Past surgical history reviewed, and pertinent for:      History reviewed. No pertinent surgical history. Social Hx:       Social History     Socioeconomic History    Marital status:      Spouse name: Not on file    Number of children: Not on file    Years of education: Not on file    Highest education level: Not on file   Occupational History    Not on file   Tobacco Use    Smoking status: Current Every Day Smoker     Packs/day: 1.00    Smokeless tobacco: Never Used   Vaping Use    Vaping Use: Never used   Substance and Sexual Activity    Alcohol use: Yes     Comment: socially    Drug use: No    Sexual activity: Yes     Partners: Female   Other Topics Concern    Not on file   Social History Narrative    Not on file     Social Determinants of Health     Financial Resource Strain:     Difficulty of Paying Living Expenses: Not on file   Food Insecurity:     Worried About Running Out of Food in the Last Year: Not on file    Ryan of Food in the Last Year: Not on file   Transportation Needs:     Lack of Transportation (Medical): Not on file    Lack of Transportation (Non-Medical):  Not on file   Physical Activity:     Days of Exercise per Week: Not on file    Minutes of Exercise per Session: Not on file   Stress:     Feeling of Stress : Not on file   Social Connections:     Frequency of Communication with Friends and Family: Not on file    Frequency of Social Gatherings with Friends and Family: Not on file    Attends Yarsanism Services: Not on file    Active Member of Clubs or Organizations: Not on file    Attends Club or Organization Meetings: Not on file    Marital Status: Not on file   Intimate Partner Violence:     Fear of Current or Ex-Partner: Not on file   Latosha Grubbs Emotionally Abused: Not on file    Physically Abused: Not on file    Sexually Abused: Not on file   Housing Stability:     Unable to Pay for Housing in the Last Year: Not on file    Number of Places Lived in the Last Year: Not on file    Unstable Housing in the Last Year: Not on file         Medications:  Previous Medications    AMIODARONE (CORDARONE) 200 MG TABLET    TAKE 1/2 TABLET BY MOUTH DAILY    AMLODIPINE (NORVASC) 10 MG TABLET    Take 1 tablet by mouth daily    LEVOTHYROXINE (SYNTHROID) 25 MCG TABLET    Take 50 mcg by mouth daily     MELOXICAM (MOBIC) 15 MG TABLET    TAKE ONE TABLET BY MOUTH DAILY    METOPROLOL TARTRATE (LOPRESSOR) 25 MG TABLET    TAKE ONE TABLET BY MOUTH TWICE A DAY       Allergies:  Penicillins    ROS:  10pt review of systems was performed and was negative except as noted in HPI     Imaging:  XR CHEST PORTABLE    (Results Pending)       Labs:  Results for orders placed or performed during the hospital encounter of 07/08/22   EKG 12 Lead   Result Value Ref Range    Ventricular Rate 59 BPM    Atrial Rate 59 BPM    P-R Interval 166 ms    QRS Duration 88 ms    Q-T Interval 480 ms    QTc Calculation (Bazett) 475 ms    P Axis 26 degrees    R Axis -62 degrees    T Axis 44 degrees    Diagnosis       Sinus bradycardiaLeft anterior fascicular blockNonspecific ST abnormality       Screenings:     Esau Coma Scale  Eye Opening: Spontaneous  Best Verbal Response: Oriented  Best Motor Response: Obeys commands  Esau Coma Scale Score: 15              Physical Exam:  Vitals: BP (!) 155/103   Pulse 51   Temp 98.4 °F (36.9 °C) (Oral)   Resp 16   Ht 5' 10\" (1.778 m)   Wt 197 lb 12.8 oz (89.7 kg)   SpO2 93%   BMI 28.38 kg/m²    General: awake, alert, no apparent distress  Pupils: equal, reactive  Eyes: EOM intact, conjunctiva clear, no discharge  Head: Non-traumatic  Neck: Supple  Mouth: Moist, no oral lesions, no tonsillar enlargement  Heart: Rate as noted, regular rhythm, no murmur or rubs.  Chest/Lungs: CTAB, no wheezes or crackles  Abdomen: soft, nondistended, no tenderness to palpation   Back: No midline tenderness. No CVA tenderness  Extremities:  2+ distal pulses bilateral UE and LE, no tenderness of calves, no edema  Neuro: Moving all extremities, no facial droop, no slurred speech, answers questions appropriately. Skin: Warm.  No visible rash, lesions, or bruising           MELVINA Nieto        [place], [place]  07/08/22 1525

## 2022-07-08 NOTE — PROGRESS NOTES
Pt. To room 3326 from ED via stretcher from vascular. Pt. VSS, patient oriented to room. Pt. Updated on POC, denies questions. Pt. Given toiletries, denies further needs. Pt c/o of 6/10 chest pain at this time. D/t bradycardia morphine being held at this time. Pt refusing nitro at this time. Bed in lowest position, call light and bedside table within reach. Will continue to monitor.

## 2022-07-08 NOTE — ACP (ADVANCE CARE PLANNING)
ADVANCED CARE PLANNING    Name:Yung Ford       :  1970              MRN:  [id number]      Purpose of Encounter: Advanced care planning. Parties in attendance: :Melani Morales MD  Decisional Capacity:Yes    Diagnosis: Principal Problem:    Chest pain  Resolved Problems:    * No resolved hospital problems. *    Patients Medical Story: Kurt Yates is a 45 y.o. male with history of HTN, hypothyroidism, current smoker (smokes ~1 pack/day) Vfib and VT, s/p AICD implant for secondary prevention of sudden cardiac death. Renetta Garcia has a pathogenic SCN5A variant, [id number], c.5350G>A with family history of a brother  from sudden cardiac death at age 36 admitted with chest pain. Goals of Care Determinations: Patient wishes to focus on all life-sustaining treatment. Plan: Will notify SARKIS BEAR of change in care plan. Will look at further interventions as needed. Code Status: At this time patient wishes to be Full Code  Time Spent with Patient: 16 minutes      Electronically signed by Jey Zavala MD on 2022 at 3:10 PM  Thank you Randy Rehman for the opportunity to be involved in this patient's care.

## 2022-07-08 NOTE — PROGRESS NOTES
Pt's HR on telemetry ranging from 30s-60s sinus bradycardia. Pt still having 6/10 chest pain. Morphine held d/t low HR and pt refusing nitro. EKG done. Hospitalist and cardiology notified. Per cardiology, hold metoprolol and interrogate pacemaker. Trop ordered, negative. 200 - pacemaker interrogated per cardiology's verbal order. Will await results.

## 2022-07-08 NOTE — PROGRESS NOTES
Patient seen in ED, room 30. Admission completed except for: Rights and Responsibilities, orientation to room, education, white board, height and weight, pain assessment and head to toe assessment. Patient is alert and oriented X 4. Patient lives at home with his wife and child and is being admitted for observation for chest pain. Plan of care updated if indicated. All questions answered. Order entered for C-diff due to diarrhea yesterday. Message sent to Dr. Rhoda Conteh regarding Levothyroxine ordered at 50 mcg; however patient is currently prescribed 75 mcg daily.

## 2022-07-08 NOTE — H&P
HOSPITALISTS HISTORY AND PHYSICAL    2022 1:59 PM    Patient Information:  Lisa Perez is a 45 y.o. male [id number]  PCP:  Liv Chew (Tel: [phone] )    Chief complaint:    Chief Complaint   Patient presents with    Chest Pain     states ongoing since , was seen at [address] [place] [address] for same thing, states he went to PCP today and was told to come to the ER. History of Present Illness:  Sierra Tsang is a 45 y.o. male with history of HTN, hypothyroidism, current smoker (smokes ~1 pack/day) Vfib and VT, s/p AICD implant for secondary prevention of sudden cardiac death. He has a pathogenic SCN5A variant, [id number], c.5350G>A with family history of a brother  from sudden cardiac death at age 36. He is currently on amiodarone and followed by Dr. Kolby Stacy. He presented with complaints of sudden onset severe chest pain since ; mid sternal, pressure-like/tightness, intermittent with no aggravating or relieving factors, no radiation but associated with nausea and left arm tingling. He denied diaphoresis, SOB, palpitations, cough, fever or chills. He also complained of left posterior thigh pain but no swelling. He was seen at [address] [place] [address] with similar complaints but discharged after negative work-up and asked to follow-up with PCP/cardiologist.   In the ED, initial troponin was negative. EKG showed sinus bradycardia with heart rate of 59 and nonspecific ST-T changes. Chest x-ray was negative. History obtained from patient. REVIEW OF SYSTEMS:   Constitutional: Negative for fever,chills or night sweats  ENT: Negative for rhinorrhea, epistaxis, hoarseness, sore throat.   Respiratory: Negative for shortness of breath,wheezing  Cardiovascular: As above  Gastrointestinal: Negative for nausea, vomiting, diarrhea  Genitourinary: Negative for polyuria, dysuria Hematologic/Lymphatic: Negative for bleeding tendency, easy bruising  Musculoskeletal: Negative for myalgias and arthralgias  Neurologic: Negative for confusion,dysarthria. Skin: Negative for itching,rash  Psychiatric: Negative for depression,anxiety, agitation. Endocrine: Negative for polydipsia,polyuria,heat /cold intolerance. Past Medical History:   has a past medical history of Anxiety, HTN (hypertension), and Hyperthyroidism. Past Surgical History:   has no past surgical history on file. Medications:  No current facility-administered medications on file prior to encounter. Current Outpatient Medications on File Prior to Encounter   Medication Sig Dispense Refill    amiodarone (CORDARONE) 200 MG tablet TAKE 1/2 TABLET BY MOUTH DAILY 45 tablet 3    meloxicam (MOBIC) 15 MG tablet TAKE ONE TABLET BY MOUTH DAILY      amLODIPine (NORVASC) 10 MG tablet Take 1 tablet by mouth daily 90 tablet 3    metoprolol tartrate (LOPRESSOR) 25 MG tablet TAKE ONE TABLET BY MOUTH TWICE A  tablet 3    levothyroxine (SYNTHROID) 25 MCG tablet Take 50 mcg by mouth daily          Allergies: Allergies   Allergen Reactions    Penicillins      SOB        Social History:  Patient Lives at home   reports that he has been smoking. He has been smoking about 1.00 pack per day. He has never used smokeless tobacco. He reports current alcohol use. He reports that he does not use drugs. Family History:  family history includes Coronary Art Dis in his father; Heart Disease in his father. Physical Exam:  BP (!) 155/103   Pulse 51   Temp 98.4 °F (36.9 °C) (Oral)   Resp 16   Ht 5' 10\" (1.778 m)   Wt 197 lb 12.8 oz (89.7 kg)   SpO2 93%   BMI 28.38 kg/m²     General appearance:  Appears comfortable. Well nourished  Eyes: Sclera clear, pupils equal  ENT: Moist mucus membranes, no thrush. Trachea midline. Cardiovascular: Regular rhythm, normal S1, S2. No murmur, gallop, rub.  No edema in lower extremities  Respiratory: Clear to auscultation bilaterally, no wheeze, good inspiratory effort  Gastrointestinal: Abdomen soft, non-tender, not distended, normal bowel sounds  Musculoskeletal: No cyanosis in digits, neck supple  Neurology: Cranial nerves grossly intact. Alert and oriented in time, place and person. No speech or motor deficits  Psychiatry: Appropriate affect. Not agitated  Skin: Warm, dry, normal turgor, no rash  Brisk capillary refill, peripheral pulses palpable   Labs:  CBC:   Lab Results   Component Value Date/Time    WBC 10.1 07/08/2022 12:25 PM    RBC 4.86 07/08/2022 12:25 PM    HGB 16.8 07/08/2022 12:25 PM    HCT 48.9 07/08/2022 12:25 PM    .6 07/08/2022 12:25 PM    MCH 34.6 07/08/2022 12:25 PM    MCHC 34.4 07/08/2022 12:25 PM    RDW 13.5 07/08/2022 12:25 PM     07/08/2022 12:25 PM    MPV 9.5 07/08/2022 12:25 PM     BMP:    Lab Results   Component Value Date/Time     07/08/2022 12:25 PM    K 4.5 07/08/2022 12:25 PM     07/08/2022 12:25 PM    CO2 26 07/08/2022 12:25 PM    BUN 20 07/08/2022 12:25 PM    CREATININE 1.2 07/08/2022 12:25 PM    CALCIUM 9.9 07/08/2022 12:25 PM    GFRAA >60 07/08/2022 12:25 PM    LABGLOM >60 07/08/2022 12:25 PM    GLUCOSE 139 07/08/2022 12:25 PM     XR CHEST PORTABLE   Final Result   No radiographic evidence of acute pulmonary disease. Chest Xray: As above  EKG: Sinus bradycardia  I visualized CXR images and EKG strips. Problem List  Active Problems:    * No active hospital problems. *  Resolved Problems:    * No resolved hospital problems. *        Assessment/Plan:     Chest pain rule out ACS:  Cardiology consulted. Continue aspirin, beta-blocker and statin. Serial troponin and EKG. Lipid panel & A1c. History of PVT:  Status post AICD. Continue amiodarone. Hypertension: Monitor BP on metoprolol and amlodipine. Hypothyroidism: Follow-up TSH. Continue Synthroid.     Left thigh pain: Follow-up venous Doppler. DVT prophylaxis: Lovenox  Code status: Full code  Diet: Regular  IV access: Peripheral  Nj Catheter: None    Admit as Observation. I anticipate hospitalization spanning less than two midnights for investigation and treatment of the above medically necessary diagnoses. Please note that some part of this chart was generated using Dragon dictation software. Although every effort was made to ensure the accuracy of this automated transcription, some errors in transcription may have occurred inadvertently. If you may need any clarification, please do not hesitate to contact me through [facility].        Shaista Castillo MD    7/8/2022 1:59 PM

## 2022-07-09 VITALS
HEIGHT: 70 IN | TEMPERATURE: 96.6 F | BODY MASS INDEX: 28 KG/M2 | HEART RATE: 54 BPM | OXYGEN SATURATION: 97 % | DIASTOLIC BLOOD PRESSURE: 74 MMHG | WEIGHT: 195.55 LBS | SYSTOLIC BLOOD PRESSURE: 113 MMHG | RESPIRATION RATE: 16 BRPM

## 2022-07-09 PROBLEM — I47.20 VT (VENTRICULAR TACHYCARDIA) (HCC): Status: ACTIVE | Noted: 2022-01-12

## 2022-07-09 LAB
A/G RATIO: 1.4 (ref 1.1–2.2)
ALBUMIN SERPL-MCNC: 4.3 G/DL (ref 3.4–5)
ALP BLD-CCNC: 86 U/L (ref 40–129)
ALT SERPL-CCNC: 23 U/L (ref 10–40)
ANION GAP SERPL CALCULATED.3IONS-SCNC: 7 MMOL/L (ref 3–16)
AST SERPL-CCNC: 29 U/L (ref 15–37)
BILIRUB SERPL-MCNC: 0.7 MG/DL (ref 0–1)
BUN BLDV-MCNC: 19 MG/DL (ref 7–20)
CALCIUM SERPL-MCNC: 9.1 MG/DL (ref 8.3–10.6)
CHLORIDE BLD-SCNC: 101 MMOL/L (ref 99–110)
CHOLESTEROL, TOTAL: 274 MG/DL (ref 0–199)
CO2: 27 MMOL/L (ref 21–32)
CREAT SERPL-MCNC: 1.1 MG/DL (ref 0.9–1.3)
EKG ATRIAL RATE: 53 BPM
EKG ATRIAL RATE: 59 BPM
EKG ATRIAL RATE: 60 BPM
EKG ATRIAL RATE: 77 BPM
EKG DIAGNOSIS: NORMAL
EKG P AXIS: 26 DEGREES
EKG P AXIS: 39 DEGREES
EKG P AXIS: 57 DEGREES
EKG P AXIS: 77 DEGREES
EKG P-R INTERVAL: 166 MS
EKG P-R INTERVAL: 184 MS
EKG P-R INTERVAL: 190 MS
EKG P-R INTERVAL: 198 MS
EKG Q-T INTERVAL: 432 MS
EKG Q-T INTERVAL: 480 MS
EKG Q-T INTERVAL: 480 MS
EKG Q-T INTERVAL: 530 MS
EKG QRS DURATION: 88 MS
EKG QRS DURATION: 96 MS
EKG QRS DURATION: 96 MS
EKG QRS DURATION: 98 MS
EKG QTC CALCULATION (BAZETT): 475 MS
EKG QTC CALCULATION (BAZETT): 480 MS
EKG QTC CALCULATION (BAZETT): 488 MS
EKG QTC CALCULATION (BAZETT): 497 MS
EKG R AXIS: -39 DEGREES
EKG R AXIS: -62 DEGREES
EKG R AXIS: -64 DEGREES
EKG R AXIS: 269 DEGREES
EKG T AXIS: 40 DEGREES
EKG T AXIS: 42 DEGREES
EKG T AXIS: 44 DEGREES
EKG T AXIS: 79 DEGREES
EKG VENTRICULAR RATE: 53 BPM
EKG VENTRICULAR RATE: 59 BPM
EKG VENTRICULAR RATE: 60 BPM
EKG VENTRICULAR RATE: 77 BPM
ESTIMATED AVERAGE GLUCOSE: 111.2 MG/DL
FOLATE: 8.63 NG/ML (ref 4.78–24.2)
GFR AFRICAN AMERICAN: >60
GFR NON-AFRICAN AMERICAN: >60
GLUCOSE BLD-MCNC: 102 MG/DL (ref 70–99)
HBA1C MFR BLD: 5.5 %
HCT VFR BLD CALC: 48 % (ref 40.5–52.5)
HDLC SERPL-MCNC: 22 MG/DL (ref 40–60)
HEMOGLOBIN: 16.1 G/DL (ref 13.5–17.5)
LDL CHOLESTEROL CALCULATED: 192 MG/DL
LV EF: 63 %
LV EF: 64 %
LVEF MODALITY: NORMAL
LVEF MODALITY: NORMAL
MAGNESIUM: 2.4 MG/DL (ref 1.8–2.4)
MCH RBC QN AUTO: 34.2 PG (ref 26–34)
MCHC RBC AUTO-ENTMCNC: 33.5 G/DL (ref 31–36)
MCV RBC AUTO: 102.1 FL (ref 80–100)
PDW BLD-RTO: 13.5 % (ref 12.4–15.4)
PHOSPHORUS: 4 MG/DL (ref 2.5–4.9)
PLATELET # BLD: 161 K/UL (ref 135–450)
PMV BLD AUTO: 9.2 FL (ref 5–10.5)
POTASSIUM SERPL-SCNC: 4.2 MMOL/L (ref 3.5–5.1)
RBC # BLD: 4.7 M/UL (ref 4.2–5.9)
SODIUM BLD-SCNC: 135 MMOL/L (ref 136–145)
T4 FREE: 1.1 NG/DL (ref 0.9–1.8)
TOTAL PROTEIN: 7.4 G/DL (ref 6.4–8.2)
TRIGL SERPL-MCNC: 298 MG/DL (ref 0–150)
TSH REFLEX FT4: 18.36 UIU/ML (ref 0.27–4.2)
VITAMIN B-12: 298 PG/ML (ref 211–911)
VLDLC SERPL CALC-MCNC: 60 MG/DL
WBC # BLD: 6.6 K/UL (ref 4–11)

## 2022-07-09 PROCEDURE — C9113 INJ PANTOPRAZOLE SODIUM, VIA: HCPCS | Performed by: INTERNAL MEDICINE

## 2022-07-09 PROCEDURE — 3430000000 HC RX DIAGNOSTIC RADIOPHARMACEUTICAL: Performed by: INTERNAL MEDICINE

## 2022-07-09 PROCEDURE — 80061 LIPID PANEL: CPT

## 2022-07-09 PROCEDURE — G0378 HOSPITAL OBSERVATION PER HR: HCPCS

## 2022-07-09 PROCEDURE — 93010 ELECTROCARDIOGRAM REPORT: CPT | Performed by: INTERNAL MEDICINE

## 2022-07-09 PROCEDURE — 36415 COLL VENOUS BLD VENIPUNCTURE: CPT

## 2022-07-09 PROCEDURE — 2580000003 HC RX 258: Performed by: INTERNAL MEDICINE

## 2022-07-09 PROCEDURE — 84439 ASSAY OF FREE THYROXINE: CPT

## 2022-07-09 PROCEDURE — 93306 TTE W/DOPPLER COMPLETE: CPT

## 2022-07-09 PROCEDURE — 84100 ASSAY OF PHOSPHORUS: CPT

## 2022-07-09 PROCEDURE — 6360000002 HC RX W HCPCS: Performed by: INTERNAL MEDICINE

## 2022-07-09 PROCEDURE — A9502 TC99M TETROFOSMIN: HCPCS | Performed by: INTERNAL MEDICINE

## 2022-07-09 PROCEDURE — 6370000000 HC RX 637 (ALT 250 FOR IP): Performed by: INTERNAL MEDICINE

## 2022-07-09 PROCEDURE — 83036 HEMOGLOBIN GLYCOSYLATED A1C: CPT

## 2022-07-09 PROCEDURE — 96376 TX/PRO/DX INJ SAME DRUG ADON: CPT

## 2022-07-09 PROCEDURE — 83735 ASSAY OF MAGNESIUM: CPT

## 2022-07-09 PROCEDURE — 85027 COMPLETE CBC AUTOMATED: CPT

## 2022-07-09 PROCEDURE — 78452 HT MUSCLE IMAGE SPECT MULT: CPT | Performed by: INTERNAL MEDICINE

## 2022-07-09 PROCEDURE — 93005 ELECTROCARDIOGRAM TRACING: CPT | Performed by: INTERNAL MEDICINE

## 2022-07-09 PROCEDURE — 84443 ASSAY THYROID STIM HORMONE: CPT

## 2022-07-09 PROCEDURE — 93017 CV STRESS TEST TRACING ONLY: CPT | Performed by: INTERNAL MEDICINE

## 2022-07-09 PROCEDURE — 82746 ASSAY OF FOLIC ACID SERUM: CPT

## 2022-07-09 PROCEDURE — 99215 OFFICE O/P EST HI 40 MIN: CPT | Performed by: INTERNAL MEDICINE

## 2022-07-09 PROCEDURE — 80053 COMPREHEN METABOLIC PANEL: CPT

## 2022-07-09 PROCEDURE — 82607 VITAMIN B-12: CPT

## 2022-07-09 RX ADMIN — ASPIRIN 81 MG: 81 TABLET, CHEWABLE ORAL at 07:42

## 2022-07-09 RX ADMIN — AMIODARONE HYDROCHLORIDE 100 MG: 200 TABLET ORAL at 07:41

## 2022-07-09 RX ADMIN — TETROFOSMIN 30 MILLICURIE: 1.38 INJECTION, POWDER, LYOPHILIZED, FOR SOLUTION INTRAVENOUS at 09:15

## 2022-07-09 RX ADMIN — SODIUM CHLORIDE, PRESERVATIVE FREE 10 ML: 5 INJECTION INTRAVENOUS at 07:41

## 2022-07-09 RX ADMIN — PANTOPRAZOLE SODIUM 40 MG: 40 INJECTION, POWDER, LYOPHILIZED, FOR SOLUTION INTRAVENOUS at 07:41

## 2022-07-09 RX ADMIN — TETROFOSMIN 10 MILLICURIE: 1.38 INJECTION, POWDER, LYOPHILIZED, FOR SOLUTION INTRAVENOUS at 08:20

## 2022-07-09 RX ADMIN — LEVOTHYROXINE SODIUM 75 MCG: 0.03 TABLET ORAL at 05:49

## 2022-07-09 NOTE — PROGRESS NOTES
Hospitalist Progress Note      PCP: Wali Correa    Date of Admission: 2022    Chief Complaint: Chest pain    Hospital Course: Sherif Bautista is a 45 y.o. male with history of HTN, hypothyroidism, current smoker (smokes ~1 pack/day) Vfib and VT, s/p AICD implant for secondary prevention of sudden cardiac death. Nancy De La Vega has a pathogenic SCN5A variant, [id number], c.5350G>A with family history of a brother  from sudden cardiac death at age 36. He is currently on amiodarone and followed by Dr. Ben Wild.     He presented with complaints of sudden onset severe chest pain since ; mid sternal, pressure-like/tightness, intermittent with no aggravating or relieving factors, no radiation but associated with nausea and left arm tingling. He denied diaphoresis, SOB, palpitations, cough, fever or chills. He also complained of left posterior thigh pain but no swelling. He was seen at [address] [place] [address] with similar complaints but discharged after negative work-up and asked to follow-up with PCP/cardiologist.   In the ED, initial troponin was negative. EKG showed sinus bradycardia with heart rate of 59 and nonspecific ST-T changes. Chest x-ray was negative. Subjective: Patient seen and examined. No interval events. Denies any complaints. No chest pain, SOB, fever or chills. HR and BP stable.        Medications:  Reviewed    Infusion Medications    sodium chloride       Scheduled Medications    sodium chloride flush  5-40 mL IntraVENous 2 times per day    aspirin  81 mg Oral Daily    atorvastatin  40 mg Oral Nightly    enoxaparin  40 mg SubCUTAneous Nightly    amiodarone  100 mg Oral Daily    [Held by provider] amLODIPine  10 mg Oral Daily    pantoprazole  40 mg IntraVENous Daily    levothyroxine  75 mcg Oral Daily     PRN Meds: regadenoson, sodium chloride flush, sodium chloride, ondansetron **OR** ondansetron, acetaminophen **OR** acetaminophen, polyethylene glycol, nitroGLYCERIN, perflutren lipid microspheres, morphine    No intake or output data in the 24 hours ending 07/09/22 1130    Physical Exam Performed:    /74   Pulse 54   Temp (!) 96.6 °F (35.9 °C) (Temporal)   Resp 16   Ht 5' 10\" (1.778 m)   Wt 195 lb 8.8 oz (88.7 kg)   SpO2 97%   BMI 28.06 kg/m²     General appearance: No apparent distress, appears stated age and cooperative. HEENT: Pupils equal, round, and reactive to light. Conjunctivae/corneas clear. Neck: Supple, with full range of motion. No jugular venous distention. Trachea midline. Respiratory:  Normal respiratory effort. Clear to auscultation, bilaterally without Rales/Wheezes/Rhonchi. Cardiovascular: Regular rate and rhythm with normal S1/S2 without murmurs, rubs or gallops. Abdomen: Soft, non-tender, non-distended with normal bowel sounds. Musculoskeletal: No clubbing, cyanosis or edema bilaterally. Full range of motion without deformity. Skin: Skin color, texture, turgor normal.  No rashes or lesions. Neurologic:  Neurovascularly intact without any focal sensory/motor deficits. Cranial nerves: II-XII intact, grossly non-focal.  Psychiatric: Alert and oriented, thought content appropriate, normal insight  Capillary Refill: Brisk,3 seconds, normal   Peripheral Pulses: +2 palpable, equal bilaterally       Labs:   Recent Labs     07/08/22  1225 07/09/22  0632   WBC 10.1 6.6   HGB 16.8 16.1   HCT 48.9 48.0    161     Recent Labs     07/08/22  1225 07/09/22  0632    135*   K 4.5 4.2    101   CO2 26 27   BUN 20 19   CREATININE 1.2 1.1   CALCIUM 9.9 9.1   PHOS  --  4.0     Recent Labs     07/09/22  0632   AST 29   ALT 23   BILITOT 0.7   ALKPHOS 86     No results for input(s): INR in the last 72 hours.   Recent Labs     07/08/22  1225 07/08/22  1535 07/08/22  1808   TROPONINI <0.01 <0.01 <0.01       Urinalysis:    No results found for: Dorethia Trivedi, BACTERIA, RBCUA, BLOODU, SPECGRAV, GLUCOSEU    Radiology:  VL Extremity Venous Bilateral XR CHEST PORTABLE   Final Result   No radiographic evidence of acute pulmonary disease. NM MYOCARDIAL SPECT REST EXERCISE OR RX    (Results Pending)           Assessment/Plan:    Active Hospital Problems    Diagnosis     Mixed hyperlipidemia [E78.2]      Priority: Medium    Hyponatremia [E87.1]      Priority: Medium    Acquired hypothyroidism [E03.9]      Priority: Medium    Smoker [F17.200]      Priority: Medium    Chest pain [R07.9]      Priority: Medium    Benign essential HTN [I10]     VT (ventricular tachycardia) (HCC) [I47.2]        Chest pain rule out ACS:  Cardiology consulted: Stress test recommended. Continue aspirin, beta-blocker and statin. Serial troponin and EKG negative. Lipid panel & A1c pending.        History of PVT: Status post AICD. Continue amiodarone & BB. Device interrogated with no rhythm issues noted.      Hypertension: BP borderline. Continue metoprolol. Hold amlodipine.     Hypothyroidism: Follow-up TSH. Continue Synthroid.     Left thigh pain: Venous Doppler negative for DVT. Macrocytosis: B 12 and folate pending. HLD: On Statin.       DVT Prophylaxis: Lovenox  Diet: Diet NPO  Code Status: Full Code     PT/OT Eval Status: Independent    Dispo - home once medically stable    Sudeep Todd MD

## 2022-07-09 NOTE — DISCHARGE SUMMARY
[facility]    [facility] Demographics    Patient. Ramona Ford  Date of Birth. 1970  MRN. [id number]     Primary care provider. Elo Osei  (Tel: [phone])    Admit date: 2022    Discharge date (blank if same as Note Date): 2022  Note Date: 2022     Reason for Hospitalization. Chief Complaint   Patient presents with    Chest Pain     states ongoing since , was seen at [facility] for same thing, states he went to PCP today and was told to come to the ER. Significant Findings. Principal Problem:    Chest pain  Active Problems:    Mixed hyperlipidemia    Hyponatremia    Acquired hypothyroidism    Smoker    Benign essential HTN    VT (ventricular tachycardia) ([facility])  Resolved Problems:    * No resolved hospital problems. *       Problems and results from this hospitalization that need follow up. 1. None     Significant test results and incidental findings. 1.   NM MYOCARDIAL SPECT REST EXERCISE OR RX   Final Result      VL Extremity Venous Bilateral         XR CHEST PORTABLE   Final Result   No radiographic evidence of acute pulmonary disease. Invasive procedures and treatments. 1. None     Problem-based Hospital Course. Viri Ford is a 45 y. o. male with history of HTN, hypothyroidism, current smoker (smokes ~1 pack/day) Vfib and VT, s/p AICD implant for secondary prevention of sudden cardiac death.  He has a pathogenic SCN5A variant, [id number], c.5350G>A with family history of a brother  from sudden cardiac death at age 40.  He is currently on amiodarone and followed by Dr. Tra Garcia.     He presented with complaints of sudden onset severe chest pain since ; mid sternal, pressure-like/tightness, intermittent with no aggravating or relieving factors, no radiation but associated with nausea and left arm tingling.  He denied diaphoresis, SOB, palpitations, cough, fever or chills.  He also complained of left posterior thigh pain but no swelling. He was seen at [facility] with similar complaints but discharged after negative work-up and asked to follow-up with PCP/cardiologist.   In the ED, initial troponin was negative. EKG showed sinus bradycardia with heart rate of 59 and nonspecific ST-T changes. Chest x-ray was negative. Chest pain rule out ACS:  Cardiology consulted: Stress test, ECHO, Serial troponin and EKG were all negative. Continued aspirin, beta-blocker. Lipid panel  pending & A1c 5.5%. Patient was discharged home to follow up with PCP after discharge.        History of PVT: Status post AICD. Continued amiodarone & BB. Device interrogated with no rhythm issues noted.      Hypertension: Resumed home medications on discharge. Hypothyroidism: Follow-up TSH.  Continued Synthroid.     Left thigh pain: Venous Doppler negative for DVT.     Macrocytosis: B 12 and folate wnl. Consults. IP CONSULT TO CARDIOLOGY    Physical examination on discharge day. /74   Pulse 54   Temp (!) 96.6 °F (35.9 °C) (Temporal)   Resp 16   Ht 5' 10\" (1.778 m)   Wt 195 lb 8.8 oz (88.7 kg)   SpO2 97%   BMI 28.06 kg/m²   General appearance. Alert. Looks comfortable. HEENT. Sclera clear. Moist mucus membranes. Cardiovascular. Regular rate and rhythm, normal S1, S2. No murmur. Respiratory. Not using accessory muscles. Clear to auscultation bilaterally, no wheeze. Gastrointestinal. Abdomen soft, non-tender, not distended, normal bowel sounds  Neurology. Facial symmetry. No speech deficits. Moving all extremities equally. Extremities. No edema in lower extremities. Skin. Warm, dry, normal turgor        Condition at time of discharge: stable. Medication instructions provided to patient at discharge.      Medication List      CONTINUE taking these medications    amiodarone 200 MG tablet  Commonly known as: CORDARONE  TAKE 1/2 TABLET BY MOUTH DAILY     amLODIPine 10 MG tablet  Commonly known as: NORVASC  Take 1 tablet by mouth daily     aspirin 325 MG tablet     levothyroxine 25 MCG tablet  Commonly known as: SYNTHROID     meloxicam 15 MG tablet  Commonly known as: MOBIC     metoprolol tartrate 25 MG tablet  Commonly known as: LOPRESSOR  TAKE ONE TABLET BY MOUTH TWICE A DAY        STOP taking these medications    acetaminophen 500 MG tablet  Commonly known as: TYLENOL            Discharge recommendations given to patient. Follow Up. With PCP in 1 week   Disposition. home  Activity. activity as tolerated  Diet: ADULT DIET; Regular      Spent 35 minutes in discharge process.     Signed:  Eva Kruger MD     7/9/2022 4:21 PM

## 2022-07-09 NOTE — CONSULTS
[address]   Electrophysiology Consultation   Date: 7/9/2022  Reason for Consultation: Chest pain, history of ventricular tachycardia  Consult Requesting Physician: Oniel Davila MD     Chief Complaint   Patient presents with    Chest Pain     states ongoing since 7/5, was seen at [address] [place] [address] for same thing, states he went to PCP today and was told to come to the ER. HPI: Bobbi Carcamo is a 45 y.o. male with history of hypertension, ventricular fibrillation and VT status post ICD implant for secondary prevention and positive for pathological SCN 5A mutation. Patient has been on low-dose amiodarone due to fear of recurrent ventricular tachycardia and ICD shock. He was admitted this time due to severe chest pain since 7/5/2022 which was pressure-like and intermittent without any aggravation factor. Troponin was negative. He was seen by interventional cardiology Dr. Armando Calvillo and a stress test was ordered. Past Medical History:   Diagnosis Date    Anxiety     HTN (hypertension) 1/12/2022    Hyperthyroidism         Past Surgical History:   Procedure Laterality Date    BACK SURGERY N/A 1995    back & neck 1995    CARDIAC DEFIBRILLATOR PLACEMENT Left 2008       Allergies   Allergen Reactions    Penicillins      SOB       Social History:  Reviewed. reports that he has been smoking. He has a 30.00 pack-year smoking history. He has never used smokeless tobacco. He reports current alcohol use. He reports that he does not use drugs. Family History:  Reviewed. family history includes Coronary Art Dis in his father; Heart Disease in his father; No Known Problems in his mother. Review of System:  All other systems reviewed and are negative except for that noted above.  Pertinent negatives are:     · General: negative for fever, chills   · Ophthalmic ROS: negative for - eye pain or loss of vision  · ENT ROS: negative for - headaches, sore throat   · Respiratory: negative for - cough, sputum  · Cardiovascular: Reviewed in HPI  · Gastrointestinal: negative for - abdominal pain, diarrhea, N/V  · Hematology: negative for - bleeding, blood clots, bruising or jaundice  · Genito-Urinary:  negative for - Dysuria or incontinence  · Musculoskeletal: negative for - Joint swelling, muscle pain  · Neurological: negative for - confusion, dizziness, headaches   · Psychiatric: No anxiety, no depression. · Dermatological: negative for - rash    Physical Examination:  Vitals:    22 0517   BP: 113/74   Pulse: 54   Resp: 16   Temp: (!) 96.6 °F (35.9 °C)   SpO2: 97%      No intake/output data recorded. Wt Readings from Last 3 Encounters:   22 195 lb 8.8 oz (88.7 kg)   22 204 lb 3.2 oz (92.6 kg)   20 198 lb (89.8 kg)     Temp  Av.4 °F (36.3 °C)  Min: 96.6 °F (35.9 °C)  Max: 98.4 °F (36.9 °C)  Pulse  Av.4  Min: 41  Max: 64  BP  Min: 113/74  Max: 155/103  SpO2  Av.3 %  Min: 93 %  Max: 100 %  No intake or output data in the 24 hours ending 22 1042    · Telemetry: Sinus rhythm   · Constitutional: Oriented. No distress. · Head: Normocephalic and atraumatic. · Mouth/Throat: Oropharynx is clear and moist.   · Eyes: Conjunctivae normal. EOM are normal.   · Neck: Neck supple. No rigidity. No JVD present. · Cardiovascular: Normal rate, regular rhythm, S1&S2. · Pulmonary/Chest: Bilateral respiratory sounds. No wheezes, No rhonchi. · Abdominal: Soft. Bowel sounds present. No distension, No tenderness. · Musculoskeletal: No tenderness. No edema    · Lymphadenopathy: Has no cervical adenopathy. · Neurological: Alert and oriented. Cranial nerve appears intact, No Gross deficit   · Skin: Skin is warm and dry. No rash noted. · Psychiatric: Has a normal behavior     Labs, diagnostic and imaging results reviewed. Reviewed.    Recent Labs     22  1225 22  0632    135*   K 4.5 4.2    101   CO2 26 27   PHOS  --  4.0   BUN 20 19   CREATININE 1.2 1.1     Recent Labs     07/08/22  1225 07/09/22  0632   WBC 10.1 6.6   HGB 16.8 16.1   HCT 48.9 48.0   .6* 102.1*    161     Lab Results   Component Value Date/Time    TROPONINI <0.01 07/08/2022 06:08 PM     No results found for: BNP  No results found for: PROTIME, INR  No results found for: CHOL, HDL, TRIG    ECG: Normal sinus rhythmLeft anterior fascicular blockCannot rule out Inferior infarct   Echo:3/2016  Summary:  The left ventricular wall motion is normal.  There is mild mitral regurgitation. There is a pacemaker lead in the right ventricle. Overall left ventricular ejection fraction is estimated to be 65-70%. The left ventricular function is normal.  The Aortic Valve is mildly calcified. ICD wire thick  Chiari network (normal variant) is noted. Mild tricuspid regurgitation is present.   Right ventricular systolic pressure is normal at <35 mmHg.     Nuclear stress test 03/17/2016   Interpetation Summary \"Nuclear imaging results reported separately by   radiology department\":      1.Non-diagnostic ECG for ischemia with pharmocologic stress.     Cath:     Scheduled Meds:   sodium chloride flush  5-40 mL IntraVENous 2 times per day    aspirin  81 mg Oral Daily    atorvastatin  40 mg Oral Nightly    enoxaparin  40 mg SubCUTAneous Nightly    amiodarone  100 mg Oral Daily    [Held by provider] amLODIPine  10 mg Oral Daily    pantoprazole  40 mg IntraVENous Daily    levothyroxine  75 mcg Oral Daily     Continuous Infusions:   sodium chloride       PRN Meds:.regadenoson, sodium chloride flush, sodium chloride, ondansetron **OR** ondansetron, acetaminophen **OR** acetaminophen, polyethylene glycol, nitroGLYCERIN, perflutren lipid microspheres, morphine     Patient Active Problem List    Diagnosis Date Noted    Chest pain 07/08/2022    HTN (hypertension) 01/12/2022    Paroxysmal VT ([address] 01/12/2022    ICD (implantable cardioverter-defibrillator) in place 03/26/2018    Long Q-T syndrome 03/26/2018      Active Hospital Problems    Diagnosis Date Noted    Chest pain [R07.9] 07/08/2022     Priority: Medium       Assessment:       Plan:    -History of ventricular tachycardia and long QT syndrome with SCN5A mutation    Patient has an ICD. Device was interrogated without any recent arrhythmias. Continue amiodarone. Liver enzymes are normal.    -Atypical chest pain    Stress test was ordered. Normal coronaries in 2007. If the stress test is abnormal, will consider left heart cath. If it is normal, I think patient can be managed medically with follow-up in the office. There is likely a component of his stress present.      -Current smoker    Advised patient to stop smoking.    -Hyperlipidemia    Continue atorvastatin. -Hypothyroidism    Continue Synthroid at the current dose. Check TSH    -Dual-chamber ICD    Device was interrogated. It has 2.8 years left on the battery. The last rhythm issue that he had was in September 2021 which was 1 second of nonsustained VT. Patient has had no rhythm issues since. OptiVol is normal.    -Hypertension    Blood pressures controlled. I would resume metoprolol.      -Hyponatremia    Adequate intake    -Elevated MCV  Consider checking vitamin B12 and folate levels. Thank you for allowing me to participate in the care of [facility]     NOTE: This report was transcribed using voice recognition software. Every effort was made to ensure accuracy, however, inadvertent computerized transcription errors may be present.

## 2022-07-09 NOTE — PROGRESS NOTES
Pt educated on stress test protocol including risks and complications. All questions answered at this time.

## 2022-08-30 NOTE — RESULT ENCOUNTER NOTE
Reviewed.     Maryana Bailey MD [facility]  [id number]  [place]  [address]  Phone: [phone]  Fax: [phone]    August 30, 2022       Patient: Manolo Orellana   MR Number: [id number]   YOB: 1975   Date of Visit: 8/30/2022       Alexandra Calderon was seen for a follow up visit today. Here is my assessment and plan as well as an attached copy of his visit today:    Non morbid obesity, unspecified obesity type  Chronic-not stable:  Discussed importance of treating obstructive sleep apnea and getting sufficient sleep to assist with weight control. Encouraged him to work on weight loss through diet and exercise. Recommended DASH or Mediterranean diets. Primary hypertension   Chronic- Stable. Discussed the importance of treating obstructive sleep apnea as part of the management of this disorder. Cont any meds per PCP and other physicians. Obstructive sleep apnea   Chronic-Improving: Reviewed and analyzed results of physiologic download from patient's machine and reviewed with patient. Supplies and parts as needed for his machine. These are medically necessary. Limit caffeine use after 3pm. Based on the analyzed data will change following settings: P min increased to 12. Pressure changes sent via machine modem. Will trial pressure increase to see if this will help with nocturia. Encouraged him to work with his DME on mask fit and comfort. Provided resources to view different styles of masks. Will see him back in 3 months. Encouraged him to contact the office with any questions or concerns. If you have questions or concerns, please do not hesitate to call me. I look forward to following Ariel Albert along with you.     Sincerely,    SAEID Sigala    CC providers:  Brandy Cooper MD  [address]  [facility] [address]  Via Fax: [phone]

## 2023-03-16 RX ORDER — AMIODARONE HYDROCHLORIDE 200 MG/1
TABLET ORAL
Qty: 45 TABLET | Refills: 3 | Status: SHIPPED | OUTPATIENT
Start: 2023-03-16

## 2024-02-22 DIAGNOSIS — Z79.899 ON AMIODARONE THERAPY: Primary | ICD-10-CM

## 2024-02-22 NOTE — TELEPHONE ENCOUNTER
Last ov:22 MXA  Next ov:  Last EK22  Last labs:22  Last filled:   Disp Refills Start End    amiodarone (CORDARONE) 200 MG tablet 45 tablet 3 3/16/2023 --    Sig: TAKE 1/2 TABLET BY MOUTH DAILY    Sent to pharmacy as: Amiodarone HCl 200 MG Oral Tablet (CORDARONE)    E-Prescribing Status: Receipt confirmed by pharmacy (3/16/2023 11:04 AM EDT)

## 2024-02-23 RX ORDER — AMIODARONE HYDROCHLORIDE 200 MG/1
TABLET ORAL
Qty: 45 TABLET | Refills: 0 | Status: SHIPPED | OUTPATIENT
Start: 2024-02-23 | End: 2024-03-28

## 2024-03-27 NOTE — PROGRESS NOTES
[facility]   Electrophysiology Follow up   Date: 3/28/2024  I had the privilege of visiting Yung Ford in the office.     CC: PVT    HPI: Yung Frod is a 53 y.o. male seen for a maintenance evaluation for PVT. History includes Vfib and VT, AICD implant for secondary prevention of sudden cardiac death. Brother  from sudden cardiac death at age 40. 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 This variant has been reported in association with ST elevation and prolonged QT intervals in some individuals. Continued with amiodarone due to fear of ICD shock which has occurred in past with palpitations while not on amio.     Last seen by EP 2022 as inpatient  for chest pain      Elevated TSH 06/15/2023 - 15.3.  He is on synthroid - repeat TSH 2024 8.6 .       Interval History: Yung presents today in follow up.  He was last seen  2022. He is still taking 100 mg almost every day.       Assessment and plan:       Paroxysmal VT (paroxysmal ventricular tachycardia) (HCC)              -History of sudden cardiac death in . He has had two appropriate shocks and two inappropriate shocks for T wave oversensing for the lifetime of the device. He has had no ICD fires since               - lopressor 25 mg BID               - Amiodarone 100 mg daily      ~ 10/16/2023 ALT 23, AST 29 [facility]                           ~ ALT 23 ,AST 29 10/06/2023  - [facility]                           ~ TSH 2024  8.610- on synthroid 88 mcg  - has decreased from 15.3 06/15/2023    ~ repeat labs ordered 2024               - reduce amiodarone to every other day                  No shock or VT on interrogation today           If recurrent VT, will substitute(mexiletine) or consider ablation  I suggested that he can skip 1 or 2 days a week- or take every other day        He takes his amiodarone 100 mg

## 2024-03-28 ENCOUNTER — OFFICE VISIT (OUTPATIENT)
Dept: CARDIOLOGY CLINIC | Age: 54
End: 2024-03-28
Payer: COMMERCIAL

## 2024-03-28 ENCOUNTER — NURSE ONLY (OUTPATIENT)
Dept: CARDIOLOGY CLINIC | Age: 54
End: 2024-03-28
Payer: COMMERCIAL

## 2024-03-28 VITALS
HEIGHT: 70 IN | OXYGEN SATURATION: 98 % | BODY MASS INDEX: 29.58 KG/M2 | HEART RATE: 92 BPM | WEIGHT: 206.6 LBS | DIASTOLIC BLOOD PRESSURE: 82 MMHG | SYSTOLIC BLOOD PRESSURE: 138 MMHG

## 2024-03-28 DIAGNOSIS — Z95.810 ICD (IMPLANTABLE CARDIOVERTER-DEFIBRILLATOR) IN PLACE: Primary | ICD-10-CM

## 2024-03-28 DIAGNOSIS — Z95.810 ICD (IMPLANTABLE CARDIOVERTER-DEFIBRILLATOR) IN PLACE: ICD-10-CM

## 2024-03-28 DIAGNOSIS — I45.81 LONG Q-T SYNDROME: ICD-10-CM

## 2024-03-28 DIAGNOSIS — I47.20 VT (VENTRICULAR TACHYCARDIA) (HCC): Primary | ICD-10-CM

## 2024-03-28 DIAGNOSIS — I47.20 VT (VENTRICULAR TACHYCARDIA) (HCC): ICD-10-CM

## 2024-03-28 DIAGNOSIS — E03.9 ACQUIRED HYPOTHYROIDISM: ICD-10-CM

## 2024-03-28 DIAGNOSIS — I10 BENIGN ESSENTIAL HTN: ICD-10-CM

## 2024-03-28 PROCEDURE — 93283 PRGRMG EVAL IMPLANTABLE DFB: CPT | Performed by: INTERNAL MEDICINE

## 2024-03-28 PROCEDURE — 3079F DIAST BP 80-89 MM HG: CPT | Performed by: INTERNAL MEDICINE

## 2024-03-28 PROCEDURE — 3075F SYST BP GE 130 - 139MM HG: CPT | Performed by: INTERNAL MEDICINE

## 2024-03-28 PROCEDURE — 93000 ELECTROCARDIOGRAM COMPLETE: CPT | Performed by: INTERNAL MEDICINE

## 2024-03-28 PROCEDURE — 99214 OFFICE O/P EST MOD 30 MIN: CPT | Performed by: INTERNAL MEDICINE

## 2024-03-28 RX ORDER — LEVOTHYROXINE SODIUM 0.1 MG/1
100 TABLET ORAL DAILY
Qty: 90 TABLET | Refills: 3 | Status: SHIPPED | COMMUNITY
Start: 2024-03-28 | End: 2024-03-28

## 2024-03-28 RX ORDER — LEVOTHYROXINE SODIUM 0.1 MG/1
100 TABLET ORAL DAILY
Qty: 30 TABLET | Refills: 5 | Status: SHIPPED | OUTPATIENT
Start: 2024-03-28

## 2024-03-28 RX ORDER — AMIODARONE HYDROCHLORIDE 200 MG/1
100 TABLET ORAL EVERY OTHER DAY
Qty: 45 TABLET | Refills: 3
Start: 2024-03-28

## 2024-03-28 NOTE — PROGRESS NOTES
[employer] FCA for Increased Potential for Reduced Energy or No Energy Delivered During High Voltage Therapy When Programmed AX > B    Changed ALL pathways per [employer] FCA to B >AX

## 2024-05-29 RX ORDER — AMIODARONE HYDROCHLORIDE 200 MG/1
100 TABLET ORAL DAILY
Qty: 45 TABLET | Refills: 3 | Status: SHIPPED | OUTPATIENT
Start: 2024-05-29

## 2024-05-29 NOTE — TELEPHONE ENCOUNTER
Requested Prescriptions     Pending Prescriptions Disp Refills    amiodarone (CORDARONE) 200 MG tablet [Pharmacy Med Name: AMIODARONE  MG TABLET] 45 tablet 3     Sig: TAKE 1/2 TABLET BY MOUTH DAILY      [facility] [id number]     Last OV:  3/28/2024 MXA    Next OV: Visit date not found     Last EK2024     Last Filled: 2024 MXA

## 2024-09-12 ENCOUNTER — TELEPHONE (OUTPATIENT)
Dept: CARDIOLOGY CLINIC | Age: 54
End: 2024-09-12

## 2024-10-22 ENCOUNTER — TELEPHONE (OUTPATIENT)
Dept: CARDIOLOGY CLINIC | Age: 54
End: 2024-10-22

## 2024-10-22 NOTE — TELEPHONE ENCOUNTER
CARDIAC CLEARANCE      What type of procedure are you having?  Colonoscopy w/ monitored anesthesia   Which physician is performing your procedure?  Beau Carney  w/ [facility]    When is your procedure scheduled for?  TBD   Where are you having this procedure?  [facility]   Are you taking Blood Thinners?   ASA  Does the surgeon want you to stop your blood thinner?  Pending Cardio     Phone Number and Contact Name for Physicians office:   [phone]  Fax number to send information:   [phone]         [Soft] : abdomen soft [Non Tender] : non-tender [Non-distended] : non-distended [No Masses] : no abdominal mass palpated [No HSM] : no HSM [Normal] : affect was normal and insight and judgment were intact [de-identified] : negative Romero's. No rebound or guarding.

## 2024-10-22 NOTE — TELEPHONE ENCOUNTER
Given TIA and possible stroke, recommend he be cleared from neurology standpoint to come off aspirin for any length of time. From cardiac standpoint, he can proceed, but the time off Aspirin is up to neurology

## 2025-05-19 RX ORDER — AMIODARONE HYDROCHLORIDE 200 MG/1
100 TABLET ORAL DAILY
Qty: 45 TABLET | Refills: 3 | Status: SHIPPED | OUTPATIENT
Start: 2025-05-19

## 2025-05-19 NOTE — TELEPHONE ENCOUNTER
Requested Prescriptions     Pending Prescriptions Disp Refills    amiodarone (CORDARONE) 200 MG tablet [Pharmacy Med Name: AMIODARONE  MG TABLET] 45 tablet 3     Sig: TAKE 1/2 TABLET BY MOUTH DAILY      Last OV:  3/28/2024 MXA    Next OV: 2025 MXA    Last EK2024    Last Filled: 2024 [facility]